# Patient Record
Sex: FEMALE | Race: WHITE | NOT HISPANIC OR LATINO | Employment: FULL TIME | URBAN - METROPOLITAN AREA
[De-identification: names, ages, dates, MRNs, and addresses within clinical notes are randomized per-mention and may not be internally consistent; named-entity substitution may affect disease eponyms.]

---

## 2017-09-06 ENCOUNTER — OFFICE VISIT (OUTPATIENT)
Dept: OBGYN | Facility: CLINIC | Age: 42
End: 2017-09-06
Payer: COMMERCIAL

## 2017-09-06 VITALS
HEART RATE: 79 BPM | BODY MASS INDEX: 28.88 KG/M2 | HEIGHT: 67 IN | DIASTOLIC BLOOD PRESSURE: 84 MMHG | WEIGHT: 184 LBS | SYSTOLIC BLOOD PRESSURE: 142 MMHG

## 2017-09-06 DIAGNOSIS — N63.0 BREAST LUMP: Primary | ICD-10-CM

## 2017-09-06 PROCEDURE — 99213 OFFICE O/P EST LOW 20 MIN: CPT | Performed by: OBSTETRICS & GYNECOLOGY

## 2017-09-06 NOTE — MR AVS SNAPSHOT
After Visit Summary   9/6/2017    Rin Garcia    MRN: [id number]           Patient Information     Date Of Birth          1975        Visit Information        Provider Department      9/6/2017 3:00 PM Guilherme Juan MD [facility]        Today's Diagnoses     Breast lump    -  1      Care Instructions      Preventive Health Recommendations  Female Ages 40 to 49    Yearly exam:     See your health care provider every year in order to  1. Review health changes.   2. Discuss preventive care.    3. Review your medicines if your doctor prescribed any.      Get a Pap test every three years (unless you have an abnormal result and your provider advises testing more often).      If you get Pap tests with HPV test, you only need to test every 5 years, unless you have an abnormal result. You do not need a Pap test if your uterus was removed (hysterectomy) and you have not had cancer.      You should be tested each year for STDs (sexually transmitted diseases), if you're at risk.       Ask your doctor if you should have a mammogram.      Have a colonoscopy (test for colon cancer) if someone in your family has had colon cancer or polyps before age 50.       Have a cholesterol test every 5 years.       Have a diabetes test (fasting glucose) after age 45. If you are at risk for diabetes, you should have this test every 3 years.    Shots: Get a flu shot each year. Get a tetanus shot every 10 years.     Nutrition:     Eat at least 5 servings of fruits and vegetables each day.    Eat whole-grain bread, whole-wheat pasta and brown rice instead of white grains and rice.    Talk to your provider about Calcium and Vitamin D.     Lifestyle    Exercise at least 150 minutes a week (an average of 30 minutes a day, 5 days a week). This will help you control your weight and prevent disease.    Limit alcohol to one drink per day.    No smoking.     Wear sunscreen to prevent skin cancer.    See your  "dentist every six months for an exam and cleaning.          Follow-ups after your visit        Follow-up notes from your care team     Return if symptoms worsen or fail to improve.      Your next 10 appointments already scheduled     Sep 20, 2017  2:00 PM CDT   MA DIAGNOSTIC DIGITAL BILATERAL with WYMA1   [facility] ([facility])    [address] [id number] [phone]           Do not use any powder, lotion or deodorant under your arms or on your breast. If you do, we will ask you to remove it before your exam.  Wear comfortable, two-piece clothing.  If you have any allergies, tell your care team.  Bring any previous mammograms from other facilities or have them mailed to the breast center.  Three-dimensional (3D) mammograms are available at [place] locations in LTAC, located within [facility], [facility], [facility], and [place]. [facility] locations include [place] [facility] in [place]. Benefits of 3D mammograms include: - Improved rate of cancer detection - Decreases your chance of having to go back for more tests, which means fewer: - \"False-positive\" results (This means that there is an abnormal area but it isn't cancer.) - Invasive testing procedures, such as a biopsy or surgery - Can provide clearer images of the breast if you have dense breast tissue. 3D mammography is an optional exam that anyone can have with a 2D mammogram. It doesn't replace or take the place of a 2D mammogram. 2D mammograms remain an effective screening test for all women.  Not all insurance companies cover the cost of a 3D mammogram. Check with your insurance.            Sep 20, 2017  2:30 PM CDT   US BREAST SHARONA CMPL 4 QUAD with WYUS4   [facility] ([facility])    [address] [id number] [phone]           Please bring a list of your medicines (including vitamins, minerals and " "over-the-counter drugs). Also, tell your doctor about any allergies you may have. Wear comfortable clothes and leave your valuables at home.  You do not need to do anything special to prepare for your exam.  Please call the Imaging Department at your exam site with any questions.              Who to contact     If you have questions or need follow up information about today's clinic visit or your schedule please contact [facility] directly at [phone].  Normal or non-critical lab and imaging results will be communicated to you by CallsFreeCallshart, letter or phone within 4 business days after the clinic has received the results. If you do not hear from us within 7 days, please contact the clinic through CallsFreeCallshart or phone. If you have a critical or abnormal lab result, we will notify you by phone as soon as possible.  Submit refill requests through [email or web address] or call your pharmacy and they will forward the refill request to us. Please allow 3 business days for your refill to be completed.          Additional Information About Your Visit        [email or web address] Information     [email or web address] lets you send messages to your doctor, view your test results, renew your prescriptions, schedule appointments and more. To sign up, go to [email or web address] . Click on \"Log in\" on the left side of the screen, which will take you to the Welcome page. Then click on \"Sign up Now\" on the right side of the page.     You will be asked to enter the access code listed below, as well as some personal information. Please follow the directions to create your username and password.     Your access code is: [email or web address]  Expires: 2017  1:40 PM     Your access code will  in 90 days. If you need help or a new code, please call your [place] clinic or [phone].        Care EveryWhere ID     This is your Care EveryWhere ID. This could be used by other organizations to access your [place] medical records  [id number]        Your Vitals " "Were     Pulse Height BMI (Body Mass Index)             79 1.708 m (5' 7.25\") 28.6 kg/m2          Blood Pressure from Last 3 Encounters:   09/06/17 142/84   10/16/14 126/78   09/22/14 125/70    Weight from Last 3 Encounters:   09/06/17 83.5 kg (184 lb)   10/16/14 73.5 kg (162 lb)   09/22/14 73.5 kg (162 lb)                 Today's Medication Changes          These changes are accurate as of: 9/6/17 11:59 PM.  If you have any questions, ask your nurse or doctor.               Stop taking these medicines if you haven't already. Please contact your care team if you have questions.     B-12 PO   Stopped by:  Guilherme Juan MD           FOLIC ACID PO   Stopped by:  Guilherme Juan MD           traMADol 50 MG tablet   Commonly known as:  ULTRAM   Stopped by:  Guilherme Juan MD                    Primary Care Provider    [facility]       No address on file        Equal Access to Services     TARSHA MCKEON AH: Hadsarwat garciao Somark, waaxda luqadaha, qaybta kaalmada adeegyada, phyllis lala . So [facility] [phone].    ATENCIÓN: Si habla español, tiene a gaitan disposición servicios gratuitos de asistencia lingüística. Llame al [phone].    We comply with applicable federal civil rights laws and [place] laws. We do not discriminate on the basis of race, color, national origin, age, disability sex, sexual orientation or gender identity.            Thank you!     Thank you for choosing [facility]  for your care. Our goal is always to provide you with excellent care. Hearing back from our patients is one way we can continue to improve our services. Please take a few minutes to complete the written survey that you may receive in the mail after your visit with us. Thank you!             Your Updated Medication List - Protect others around you: Learn how to safely use, store and throw away your medicines at www.disposemymeds.org.          This " list is accurate as of: 9/6/17 11:59 PM.  Always use your most recent med list.                   Brand Name Dispense Instructions for use Diagnosis    COMPLETE PROTEIN/VITAMIN SHAKE PO      Modere - daily protein and vitamin    Breast lump       MULTIVITAMIN TABS   OR      1 TABLET BY MOUTH DAILY

## 2017-09-06 NOTE — PROGRESS NOTES
SUBJECTIVE:   CC: Rin Garcia is an 42 year old woman who presents for preventive health visit.     Healthy Habits:    Do you get at least three servings of calcium containing foods daily (dairy, green leafy vegetables, etc.)? yes    Amount of exercise or daily activities, outside of work: 5 day(s) per week    Problems taking medications regularly not applicable    Medication side effects: No    Have you had an eye exam in the past two years? yes    Do you see a dentist twice per year? no    Do you have sleep apnea, excessive snoring or daytime drowsiness?no          Noticed Right breast lump on 8/8/17.     Today's PHQ-2 Score: PHQ-2 ( 1999 Pfizer) 9/6/2017 9/19/2012   Q1: Little interest or pleasure in doing things 0 1   Q2: Feeling down, depressed or hopeless 0 1   PHQ-2 Score 0 2         Abuse: Current or Past(Physical, Sexual or Emotional)- No  Do you feel safe in your environment - Yes  Social History   Substance Use Topics     Smoking status: Never Smoker     Smokeless tobacco: Never Used     Alcohol use Yes      Comment: occas.     The patient does not drink >3 drinks per day nor >7 drinks per week.    Reviewed orders with patient.  Reviewed health maintenance and updated orders accordingly - Yes  BP Readings from Last 3 Encounters:   09/06/17 142/84   10/16/14 126/78   09/22/14 125/70    Wt Readings from Last 3 Encounters:   09/06/17 83.5 kg (184 lb)   10/16/14 73.5 kg (162 lb)   09/22/14 73.5 kg (162 lb)                  Patient Active Problem List   Diagnosis     CARDIOVASCULAR SCREENING; LDL GOAL LESS THAN 160     Breast lump     Past Surgical History:   Procedure Laterality Date     GYN SURGERY      hysterectomy     HYSTERECTOMY, PAP NO LONGER INDICATED       PELVIS LAPAROSCOPY,DX      2003,04,06       Social History   Substance Use Topics     Smoking status: Never Smoker     Smokeless tobacco: Never Used     Alcohol use Yes      Comment: occas.     Family History   Problem Relation Age of Onset      Gynecology Mother      hysterectomy- pre ca     Hypertension Mother      CANCER Maternal Grandfather      CANCER Paternal Grandfather      CANCER Father      prostate and kidney     Dementia Maternal Grandmother          Current Outpatient Prescriptions   Medication Sig Dispense Refill     Nutritional Supplements (COMPLETE PROTEIN/VITAMIN SHAKE PO) Modere - daily protein and vitamin       MULTIVITAMIN TABS   OR 1 TABLET BY MOUTH DAILY       Allergies   Allergen Reactions     Augmentin Nausea and Vomiting and Diarrhea     Vicodin [Hydrocodone-Acetaminophen] Itching           Patient under age 50, mutual decision reflected in health maintenance.        Pertinent mammograms are reviewed under the imaging tab.  History of abnormal Pap smear: NO - age 30- 65 PAP every 3 years recommended    Reviewed and updated as needed this visit by clinical staffTobacco  Allergies  Meds  Med Hx  Surg Hx  Fam Hx  Soc Hx        Reviewed and updated as needed this visit by Provider        Past Medical History:   Diagnosis Date     Abnormal Pap smear of cervix remote past    ?details?     Endometriosis      Endometriosis 2009     PONV (postoperative nausea and vomiting)       Past Surgical History:   Procedure Laterality Date     GYN SURGERY      hysterectomy     HYSTERECTOMY, PAP NO LONGER INDICATED       PELVIS LAPAROSCOPY,DX      ,     Obstetric History       T1      L1     SAB0   TAB0   Ectopic0   Multiple0   Live Births0       # Outcome Date GA Lbr Poncho/2nd Weight Sex Delivery Anes PTL Lv   1 Term               Name: Santhosh          ROS:  C: NEGATIVE for fever, chills, change in weight  I: NEGATIVE for worrisome rashes, moles or lesions  E: NEGATIVE for vision changes or irritation  ENT: NEGATIVE for ear, mouth and throat problems  R: NEGATIVE for significant cough or SOB  BREAST: POSITIVE for NEGATIVE and lump grape sized in the right breast  CV: NEGATIVE for chest pain, palpitations or peripheral  "edema  GI: NEGATIVE for nausea, abdominal pain, heartburn, or change in bowel habits  : NEGATIVE for unusual urinary or vaginal symptoms. Periods are regular.  M: NEGATIVE for significant arthralgias or myalgia  N: NEGATIVE for weakness, dizziness or paresthesias  E: NEGATIVE for temperature intolerance, skin/hair changes  P: NEGATIVE for changes in mood or affect    OBJECTIVE:   /84 (BP Location: Right arm, Cuff Size: Adult Large)  Pulse 79  Ht 5' 7.25\" (1.708 m)  Wt 184 lb (83.5 kg)  BMI 28.6 kg/m2  EXAM:  GENERAL: healthy, alert and no distress  EYES: Eyes grossly normal to inspection, PERRL and conjunctivae and sclerae normal  NECK: no adenopathy, no asymmetry, masses, or scars and thyroid normal to palpation  BREAST: Left:normal without masses, tenderness or nipple discharge, no palpable axillary masses or adenopathy   Right  mass right breast 1 cm mobile and nontender-     CV: regular rate and rhythm, normal S1 S2, no S3 or S4, no murmur, click or rub, no peripheral edema and peripheral pulses strong  ABDOMEN: soft, nontender, no hepatosplenomegaly, no masses and bowel sounds normal   (female): normal female external genitalia, normal urethral meatus, vaginal mucosa pink, moist, well rugated, and normal cervix/adnexa/uterus without masses or discharge  MS: no gross musculoskeletal defects noted, no edema  SKIN: no suspicious lesions or rashes  NEURO: Normal strength and tone, mentation intact and speech normal  PSYCH: mentation appears normal, affect normal/bright    ASSESSMENT/PLAN:   1. Breast lump  Right  - Nutritional Supplements (COMPLETE PROTEIN/VITAMIN SHAKE PO); Modere - daily protein and vitamin  - MA Diagnostic Digital Bilateral; Future  - US Breast Bilateral Complete 4 Quadrants; Future    COUNSELING:   Reviewed preventive health counseling, as reflected in patient instructions       Regular exercise       Healthy diet/nutrition         reports that she has never smoked. She has never " "used smokeless tobacco.    Estimated body mass index is 28.6 kg/(m^2) as calculated from the following:    Height as of this encounter: 5' 7.25\" (1.708 m).    Weight as of this encounter: 184 lb (83.5 kg).         Counseling Resources:  ATP IV Guidelines  Pooled Cohorts Equation Calculator  Breast Cancer Risk Calculator  FRAX Risk Assessment  ICSI Preventive Guidelines  Dietary Guidelines for Americans, 2010  USDA's MyPlate  ASA Prophylaxis  Lung CA Screening    Guilherme Juan MD  [facility]  "

## 2017-09-20 ENCOUNTER — HOSPITAL ENCOUNTER (OUTPATIENT)
Dept: MAMMOGRAPHY | Facility: CLINIC | Age: 42
Discharge: HOME | End: 2017-09-20
Attending: OBSTETRICS & GYNECOLOGY | Admitting: OBSTETRICS & GYNECOLOGY
Payer: COMMERCIAL

## 2017-09-20 ENCOUNTER — HOSPITAL ENCOUNTER (OUTPATIENT)
Dept: ULTRASOUND IMAGING | Facility: CLINIC | Age: 42
End: 2017-09-20
Attending: OBSTETRICS & GYNECOLOGY
Payer: COMMERCIAL

## 2017-09-20 DIAGNOSIS — N63.0 BREAST LUMP: ICD-10-CM

## 2017-09-20 PROCEDURE — G0279 TOMOSYNTHESIS, MAMMO: HCPCS

## 2017-09-20 PROCEDURE — G0204 DX MAMMO INCL CAD BI: HCPCS

## 2017-09-20 PROCEDURE — 76642 ULTRASOUND BREAST LIMITED: CPT | Mod: 50

## 2017-10-04 ENCOUNTER — HOSPITAL ENCOUNTER (OUTPATIENT)
Dept: ULTRASOUND IMAGING | Facility: CLINIC | Age: 42
Discharge: HOME | End: 2017-10-04
Attending: OBSTETRICS & GYNECOLOGY | Admitting: OBSTETRICS & GYNECOLOGY
Payer: COMMERCIAL

## 2017-10-04 ENCOUNTER — HOSPITAL ENCOUNTER (OUTPATIENT)
Dept: MAMMOGRAPHY | Facility: CLINIC | Age: 42
End: 2017-10-04
Attending: OBSTETRICS & GYNECOLOGY
Payer: COMMERCIAL

## 2017-10-04 DIAGNOSIS — N64.9 BREAST LESION: ICD-10-CM

## 2017-10-04 PROCEDURE — 88305 TISSUE EXAM BY PATHOLOGIST: CPT | Performed by: RADIOLOGY

## 2017-10-04 PROCEDURE — 40000986 MA POST PROCEDURE RIGHT

## 2017-10-04 PROCEDURE — 27210206 US BREAST BIOPSY CORE NEEDLE RIGHT

## 2017-10-04 PROCEDURE — 25000125 ZZHC RX 250: Performed by: RADIOLOGY

## 2017-10-04 PROCEDURE — 88305 TISSUE EXAM BY PATHOLOGIST: CPT | Mod: 26 | Performed by: RADIOLOGY

## 2017-10-04 RX ADMIN — LIDOCAINE HYDROCHLORIDE 8 ML: 10 INJECTION, SOLUTION EPIDURAL; INFILTRATION; INTRACAUDAL; PERINEURAL at 12:47

## 2017-10-04 NOTE — PROGRESS NOTES
RADIOLOGY PROCEDURE NOTE  Patient name: Rin Garcia  MRN: [id number]  : 1975    Pre-procedure diagnosis: Right breast lesion.  Post-procedure diagnosis: Same    Procedure Date/Time: 2017  1:04 PM  Procedure: US guided needle core biopsy.  Estimated blood loss: None  Specimen(s) collected:  Five needle cores.  The patient tolerated the procedure well with no immediate complications.    See imaging dictation for procedural details.    Provider name: Michael Souza  Assistant(s):None

## 2017-10-04 NOTE — IP AVS SNAPSHOT
MRN:[id number]                      After Visit Summary   10/4/2017    Rin Garcia    MRN: [id number]           Visit Information        Provider Department      10/4/2017 12:30 PM WY RAD; WYUS4 [facility]           Review of your medicines      UNREVIEWED medicines. Ask your doctor about these medicines        Dose / Directions    COMPLETE PROTEIN/VITAMIN SHAKE PO   Used for:  Breast lump        Modere - daily protein and vitamin   Refills:  0       MULTIVITAMIN TABS   OR        1 TABLET BY MOUTH DAILY   Refills:  0                Protect others around you: Learn how to safely use, store and throw away your medicines at www.disposemymeds.org.         Follow-ups after your visit        Your next 10 appointments already scheduled     Oct 04, 2017 12:30 PM CDT   US BREAST BIOPSY CORE NEEDLE RIGHT with WYUS4, WY RAD   [facility] ([facility])    [address] [address] [phone]           Tell us in advance if there s any chance you may be pregnant.  Bring a list of your medicines to the exam. Include vitamins, minerals and over-the-counter drugs.  If you take blood thinners, you may need to stop taking them a few days before treatment. Talk to your doctor before stopping these medicines. You will need a blood test the morning of your exam.   Stop taking Coumadin (warfarin) 3 days before your exam. Restart the day after your exam.   If you take aspirin, you may need to stop taking it 3 days before your scan.   If you take Plavix, Ticlid, Pletal or Persantine, you may need to stop taking them 5 days before your scan. Please talk to your doctor before stopping these medicines.  If you will receive sedation for this test (medicine to help you relax):   See your family doctor for an exam within 30 days of treatment.   Plan for an adult to drive you home and stay with you for at least 6 hours.   Follow the eating and drinking  guidelines checked below:   No eating or drinking for 4 hours before your test. You may take medicine with small sips of water.   If you have diabetes:If you take insulin, call your diabetes care team. Do not take diabetes pills on the morning of your test. If you take metformin (Avandamet, Glucophage, Glucovance, Metaglip) and received contrast, wait 48 hours before re-starting this medicine.  Please call the Imaging Department at your exam site with any questions.            Oct 04, 2017  1:30 PM CDT   MA POST PROCEDURE RIGHT with WYMA1   [facility] ([facility])    [address]  [facility] [address]   [phone]           Do not use any powder, lotion or deodorant under your arms or on your breast. If you do, we will ask you to remove it before your exam.  Wear comfortable, two-piece clothing.  If you have any allergies, tell your care team.  Bring any previous mammograms from other facilities or have them mailed to the breast center.               Care Instructions        Further instructions from your care team       Breast Biopsy Care Instructions      Site Care:    You may have some discomfort in the breast and may see some bruising at the needle site.    You will be given an ice pack which should be kept in place over the dressing until bedtime tonight.Always keep a gauze pad between your skin and the ice pack.    Wearing your bra continuously for 24 hours post biopsy will give optimal support to the biopsy site.    Activity:       You may go back to your normal routine.     No heavy lifting for 24 hours.    Diet and Medications:       You may go back to your regular diet.     Tylenol is the best choice to relieve your discomfort, the first 24 hours. If you have no bleeding on the first day, Ibuprofen (such as Advil, or Motrin), may be taken on the second day after for pain.     Do not take aspirin for 72 hours following your biopsy.    Questions:     If you have any  "questions about your procedure performed in Ultrasound, you may contact us at [phone].If you have any questions about your procedure performed in Mammography, you may contact us at [phone].    Results:       The pathology report on your biopsy is usually available within 72 hours. The Radiologist or your personal physician will call you with the results.     You will receive information about any further follow up from your physician.    Call your doctor if you have:       More than slight bleeding or significant pain, or experience swelling of the breast.     If your physician is unavailable, please call the Nurse Advice Line at [phone], or [facility] Emergency Department at [phone].      Patient:______________________________  Time:_________  Date:_____________    Instructor:____________________________   Time:_________  Date:_____________      Additional Information About Your Visit        MyChart Information     TapPresshart lets you send messages to your doctor, view your test results, renew your prescriptions, schedule appointments and more. To sign up, go to [email or web address] . Click on \"Log in\" on the left side of the screen, which will take you to the Welcome page. Then click on \"Sign up Now\" on the right side of the page.     You will be asked to enter the access code listed below, as well as some personal information. Please follow the directions to create your username and password.     Your access code is: [email or web address]  Expires: 2017  1:40 PM     Your access code will  in 90 days. If you need help or a new code, please call your [place] clinic or [phone].        Care EveryWhere ID     This is your Care EveryWhere ID. This could be used by other organizations to access your [place] medical records  [id number]         Primary Care Provider Office Phone # Fax #    [facility] [phone]      Equal Access to Services     TARSHA" GAAR : Hadii aad ku hadmelodori Sallyali, waaxda luqadaha, qaybta kanatefloyd cornejo, phyllis lala . So [facility] [phone].    ATENCIÓN: Si habla español, tiene a gaitan disposición servicios gratuitos de asistencia lingüística. Llame al [phone].    We comply with applicable federal civil rights laws and [place] laws. We do not discriminate on the basis of race, color, national origin, age, disability, sex, sexual orientation, or gender identity.            Thank you!     Thank you for choosing [place] for your care. Our goal is always to provide you with excellent care. Hearing back from our patients is one way we can continue to improve our services. Please take a few minutes to complete the written survey that you may receive in the mail after you visit with us. Thank you!             Medication List: This is a list of all your medications and when to take them. Check marks below indicate your daily home schedule. Keep this list as a reference.      Medications           Morning Afternoon Evening Bedtime As Needed    COMPLETE PROTEIN/VITAMIN SHAKE PO   Modere - daily protein and vitamin                                MULTIVITAMIN TABS   OR   1 TABLET BY MOUTH DAILY

## 2017-10-04 NOTE — IP AVS SNAPSHOT
[facility]    [address] [facility] [address]    Phone:  [phone]                                       After Visit Summary   10/4/2017    Rin Garcia    MRN: [id number]           After Visit Summary Signature Page     I have received my discharge instructions, and my questions have been answered. I have discussed any challenges I see with this plan with the nurse or doctor.    ..........................................................................................................................................  Patient/Patient Representative Signature      ..........................................................................................................................................  Patient Representative Print Name and Relationship to Patient    ..................................................               ................................................  Date                                            Time    ..........................................................................................................................................  Reviewed by Signature/Title    ...................................................              ..............................................  Date                                                            Time

## 2017-10-04 NOTE — DISCHARGE INSTRUCTIONS
Breast Biopsy Care Instructions      Site Care:    You may have some discomfort in the breast and may see some bruising at the needle site.    You will be given an ice pack which should be kept in place over the dressing until bedtime tonight.Always keep a gauze pad between your skin and the ice pack.    Wearing your bra continuously for 24 hours post biopsy will give optimal support to the biopsy site.    Activity:       You may go back to your normal routine.     No heavy lifting for 24 hours.    Diet and Medications:       You may go back to your regular diet.     Tylenol is the best choice to relieve your discomfort, the first 24 hours. If you have no bleeding on the first day, Ibuprofen (such as Advil, or Motrin), may be taken on the second day after for pain.     Do not take aspirin for 72 hours following your biopsy.    Questions:     If you have any questions about your procedure performed in Ultrasound, you may contact us at [phone].If you have any questions about your procedure performed in Mammography, you may contact us at [phone].    Results:       The pathology report on your biopsy is usually available within 72 hours. The Radiologist or your personal physician will call you with the results.     You will receive information about any further follow up from your physician.    Call your doctor if you have:       More than slight bleeding or significant pain, or experience swelling of the breast.     If your physician is unavailable, please call the Nurse Advice Line at [phone], or [facility] Emergency Department at [phone].      Patient:______________________________  Time:_________  Date:_____________    Instructor:____________________________   Time:_________  Date:_____________

## 2017-10-09 LAB — COPATH REPORT: NORMAL

## 2019-11-03 ENCOUNTER — HEALTH MAINTENANCE LETTER (OUTPATIENT)
Age: 44
End: 2019-11-03

## 2019-11-19 ENCOUNTER — TRANSFERRED RECORDS (OUTPATIENT)
Dept: HEALTH INFORMATION MANAGEMENT | Facility: CLINIC | Age: 44
End: 2019-11-19

## 2020-11-16 ENCOUNTER — HEALTH MAINTENANCE LETTER (OUTPATIENT)
Age: 45
End: 2020-11-16

## 2021-03-01 ENCOUNTER — HOSPITAL ENCOUNTER (OUTPATIENT)
Dept: MAMMOGRAPHY | Facility: CLINIC | Age: 46
Discharge: HOME | End: 2021-03-01
Attending: OBSTETRICS & GYNECOLOGY | Admitting: OBSTETRICS & GYNECOLOGY
Payer: COMMERCIAL

## 2021-03-01 DIAGNOSIS — Z12.31 SCREENING MAMMOGRAM FOR HIGH-RISK PATIENT: ICD-10-CM

## 2021-03-01 PROCEDURE — 77063 BREAST TOMOSYNTHESIS BI: CPT

## 2021-09-18 ENCOUNTER — HEALTH MAINTENANCE LETTER (OUTPATIENT)
Age: 46
End: 2021-09-18

## 2022-01-08 ENCOUNTER — HEALTH MAINTENANCE LETTER (OUTPATIENT)
Age: 47
End: 2022-01-08

## 2022-04-13 ENCOUNTER — HOSPITAL ENCOUNTER (OUTPATIENT)
Dept: MAMMOGRAPHY | Facility: CLINIC | Age: 47
Discharge: HOME | End: 2022-04-13
Attending: OBSTETRICS & GYNECOLOGY | Admitting: OBSTETRICS & GYNECOLOGY
Payer: COMMERCIAL

## 2022-04-13 DIAGNOSIS — Z12.31 VISIT FOR SCREENING MAMMOGRAM: ICD-10-CM

## 2022-04-13 PROCEDURE — 77067 SCR MAMMO BI INCL CAD: CPT

## 2022-04-19 ENCOUNTER — OFFICE VISIT (OUTPATIENT)
Dept: OBGYN | Facility: CLINIC | Age: 47
End: 2022-04-19
Payer: COMMERCIAL

## 2022-04-19 VITALS
DIASTOLIC BLOOD PRESSURE: 94 MMHG | HEIGHT: 67 IN | BODY MASS INDEX: 31.39 KG/M2 | TEMPERATURE: 97.6 F | HEART RATE: 88 BPM | WEIGHT: 200 LBS | SYSTOLIC BLOOD PRESSURE: 164 MMHG

## 2022-04-19 DIAGNOSIS — Z80.3 FAMILY HISTORY OF MALIGNANT NEOPLASM OF BREAST: ICD-10-CM

## 2022-04-19 DIAGNOSIS — N95.1 MENOPAUSAL SYNDROME (HOT FLASHES): ICD-10-CM

## 2022-04-19 DIAGNOSIS — R03.0 ELEVATED BLOOD-PRESSURE READING WITHOUT DIAGNOSIS OF HYPERTENSION: ICD-10-CM

## 2022-04-19 DIAGNOSIS — R10.2 PELVIC PAIN IN FEMALE: Primary | ICD-10-CM

## 2022-04-19 LAB
ESTRADIOL SERPL-MCNC: 92 PG/ML
FSH SERPL-ACNC: 3.7 IU/L

## 2022-04-19 PROCEDURE — 99204 OFFICE O/P NEW MOD 45 MIN: CPT | Performed by: OBSTETRICS & GYNECOLOGY

## 2022-04-19 PROCEDURE — 83001 ASSAY OF GONADOTROPIN (FSH): CPT | Performed by: OBSTETRICS & GYNECOLOGY

## 2022-04-19 PROCEDURE — 36415 COLL VENOUS BLD VENIPUNCTURE: CPT | Performed by: OBSTETRICS & GYNECOLOGY

## 2022-04-19 PROCEDURE — 82670 ASSAY OF TOTAL ESTRADIOL: CPT | Performed by: OBSTETRICS & GYNECOLOGY

## 2022-04-19 NOTE — NURSING NOTE
"Initial BP (!) 164/94 (BP Location: Right arm, Patient Position: Chair, Cuff Size: Adult Regular)   Pulse 88   Temp 97.6  F (36.4  C) (Tympanic)   Ht 1.702 m (5' 7\")   Wt 90.7 kg (200 lb)   Breastfeeding No   BMI 31.32 kg/m   Estimated body mass index is 31.32 kg/m  as calculated from the following:    Height as of this encounter: 1.702 m (5' 7\").    Weight as of this encounter: 90.7 kg (200 lb). .    Darlene Norris MA    "

## 2022-04-23 ENCOUNTER — HOSPITAL ENCOUNTER (OUTPATIENT)
Dept: ULTRASOUND IMAGING | Facility: CLINIC | Age: 47
Discharge: HOME | End: 2022-04-23
Attending: OBSTETRICS & GYNECOLOGY | Admitting: OBSTETRICS & GYNECOLOGY
Payer: COMMERCIAL

## 2022-04-23 DIAGNOSIS — R10.2 PELVIC PAIN IN FEMALE: ICD-10-CM

## 2022-04-23 PROCEDURE — 76856 US EXAM PELVIC COMPLETE: CPT

## 2022-04-28 ENCOUNTER — TELEPHONE (OUTPATIENT)
Dept: OBGYN | Facility: CLINIC | Age: 47
End: 2022-04-28
Payer: COMMERCIAL

## 2022-04-28 NOTE — TELEPHONE ENCOUNTER
Spoke to pt to schedule surgery.  She will call us back with a date.    -Chiquis Eddy  [facility]

## 2022-04-28 NOTE — PROGRESS NOTES
CC:  Follow up  from herself for abdomino-pelvic pain  HPI:  Rin Garcia is a 47 year old female is a   .  No LMP recorded. Patient has had a hysterectomy.  She has a hx of  Hysterectomy for  Endometriosis in the past  She has new onset of dyspareunia over the last year  She experiences hot flashes and night sweats.  She denies vaginal dryness, bleeding  She describes the pain as  Sharp and intermittent.  She is s/p hysterectomy in 2010  Her pap's have been normal  Patients records are available and reviewed at today's visit.  FAMILY HISTORY   The patient has a family history of ovarian cancer  And breast cancer - none of which have been in first degree relatives    Past GYN history:  No STD history       Last PAP smear:  Normal    Past Medical History:   Diagnosis Date     Abnormal Pap smear of cervix remote past    ?details?     Endometriosis      Endometriosis 08/18/2009     PONV (postoperative nausea and vomiting)        Past Surgical History:   Procedure Laterality Date     BIOPSY BREAST       GYN SURGERY      hysterectomy     HYSTERECTOMY, PAP NO LONGER INDICATED       PELVIS LAPAROSCOPY,DX      2003,04,06       Family History   Problem Relation Age of Onset     Gynecology Mother         hysterectomy- pre ca     Hypertension Mother      Cancer Maternal Grandfather      Cancer Paternal Grandfather      Cancer Father         prostate and kidney     Dementia Maternal Grandmother        Allergies: Augmentin and Vicodin [hydrocodone-acetaminophen]    Current Outpatient Medications   Medication Sig Dispense Refill     MULTIVITAMIN TABS   OR 1 TABLET BY MOUTH DAILY       psyllium (METAMUCIL/KONSYL) 58.6 % powder Take by mouth daily         ROS:  C: NEGATIVE for fever, chills, change in weight  I: NEGATIVE for worrisome rashes, moles or lesions  E: NEGATIVE for vision changes or irritation  E/M: NEGATIVE for ear, mouth and throat problems  R: NEGATIVE for significant cough or SOB  CV: NEGATIVE for chest  "pain, palpitations or peripheral edema  GI: NEGATIVE for nausea, abdominal pain, heartburn, or change in bowel habits  : NEGATIVE for frequency, dysuria, hematuria, vaginal discharge  M: NEGATIVE for significant arthralgias or myalgia  N: NEGATIVE for weakness, dizziness or paresthesias  E: NEGATIVE for temperature intolerance, skin/hair changes  P: NEGATIVE for changes in mood or affect    EXAM:  Blood pressure (!) 164/94, pulse 88, temperature 97.6  F (36.4  C), temperature source Tympanic, height 1.702 m (5' 7\"), weight 90.7 kg (200 lb), not currently breastfeeding.   BMI= Body mass index is 31.32 kg/m .  General - pleasant female in no acute distress.  Abdomen - soft, nontender, nondistended, no hepatosplenomegaly.  Pelvic - EG: normal adult female,   BUS: within normal limits,   Vagina: well rugated, no discharge, well supported  Cervix:/Uterus: surgically absent  Adnexae: no masses or tenderness.  Rectovaginal - deferred.  Musculoskeletal - no gross deformities.  Neurological - normal strength, sensation, and mental status.      ASSESSMENT/PLAN:  (R10.2) Pelvic pain in female  (primary encounter diagnosis)  Comment: ULTRASOUND negative for pelvic massesPlan: US Pelvic Transabdominal and Transvaginal, Case        Request: Laparoscopy            (N95.1) Menopausal syndrome (hot flashes)  Comment:   Plan: Estradiol, Follicle stimulating hormone, Case         Request: Laparoscopy            (Z80.3) Family history of malignant neoplasm of breast  Comment:   Plan: Case Request: Laparoscopy            (R03.0) Elevated blood-pressure reading without diagnosis of hypertension  Comment:   Plan:follow up with her PCP    I described the procedures as indicated above. The types of anesthesia were reviewed. The pre and post-op expectations were noted. We discussed the risks and benefits of the above procedures. The risk of bleeding, infection and damage to other organs was reviewed. The possibility of much larger " incision(s) was discussed.  No guarantees were made.  She did express understanding and desires to proceed with surgery.    Letter will be sent to the referring provider.    Guilherme Juan MD

## 2022-05-02 NOTE — TELEPHONE ENCOUNTER
"[id number]  Rin Garcia    You are now scheduled for surgery at [facility].  Below are the details for your surgery.  Please read the \"Preparing for Your Surgery\" instructions and let us know if you have any questions.    Type of surgery:  Laparoscopy  Surgeon:  Guilherme Juan MD  Location of surgery: [facility] OR    Date of surgery:  6/6/22    Time: 10:10 am   Arrival Time:  9:00 am    Time can change, to be confirmed a couple of days prior by pre-op surgery nurse.    Pre-Op Appt Date: Patient to schedule with a PCP or Family Practice Provider within 30 days to the surgery.  Post-Op Appt Date: To be determined by provider     COVID test 2-4 days prior at [facility]   Date  6/3/22  Time  3:00 pm (Get Soap from Pharmacy for surgery)     Packet sent out: Yes  Pre-cert/Authorization completed:  TBD by Financial Securing Office.   MA Sterilization/Hysterectomy Acknowledgment Consent signed: No    [facility]  [phone]    Fax: [phone]  Same Day Surgery [phone]  Fax: [phone]  [facility] [phone]      "

## 2022-05-26 DIAGNOSIS — Z11.59 ENCOUNTER FOR SCREENING FOR OTHER VIRAL DISEASES: Primary | ICD-10-CM

## 2022-05-31 ENCOUNTER — OFFICE VISIT (OUTPATIENT)
Dept: FAMILY MEDICINE | Facility: CLINIC | Age: 47
End: 2022-05-31
Payer: COMMERCIAL

## 2022-05-31 VITALS
RESPIRATION RATE: 16 BRPM | OXYGEN SATURATION: 98 % | HEART RATE: 82 BPM | WEIGHT: 197.4 LBS | TEMPERATURE: 97.5 F | HEIGHT: 67 IN | DIASTOLIC BLOOD PRESSURE: 88 MMHG | BODY MASS INDEX: 30.98 KG/M2 | SYSTOLIC BLOOD PRESSURE: 140 MMHG

## 2022-05-31 DIAGNOSIS — R10.2 PELVIC PAIN IN FEMALE: Primary | ICD-10-CM

## 2022-05-31 DIAGNOSIS — E78.2 MIXED HYPERLIPIDEMIA: ICD-10-CM

## 2022-05-31 DIAGNOSIS — Z01.818 PREOP GENERAL PHYSICAL EXAM: ICD-10-CM

## 2022-05-31 DIAGNOSIS — R03.0 ELEVATED BLOOD PRESSURE READING WITHOUT DIAGNOSIS OF HYPERTENSION: ICD-10-CM

## 2022-05-31 PROCEDURE — 99204 OFFICE O/P NEW MOD 45 MIN: CPT | Performed by: FAMILY MEDICINE

## 2022-05-31 RX ORDER — METHOCARBAMOL 500 MG/1
TABLET, FILM COATED ORAL
COMMUNITY
Start: 2022-04-06 | End: 2023-04-14

## 2022-05-31 ASSESSMENT — PAIN SCALES - GENERAL: PAINLEVEL: NO PAIN (0)

## 2022-05-31 NOTE — PROGRESS NOTES
[facility]  [address] [place] [id number]  Phone: [phone]  Fax: [phone]  Primary Provider: [facility]        PREOPERATIVE EVALUATION:  Today's date: 5/31/2022    Rin Garcia is a 47 year old female who presents for a preoperative evaluation.    Surgical Information:  Surgery/Procedure: Bilateral laproscopy  Surgery Location: [facility]  Surgeon: Guilherme Juan  Surgery Date: 6/6/2022  Time of Surgery: TBD  Where patient plans to recover: At home with family  Fax number for surgical facility: Note does not need to be faxed, will be available electronically in Epic.    Type of Anesthesia Anticipated: General    Assessment & Plan     The proposed surgical procedure is considered LOW risk.      ICD-10-CM    1. Pelvic pain in female  R10.2    2. Preop general physical exam  Z01.818    3. Elevated blood pressure reading without diagnosis of hypertension  R03.0    4. Mixed hyperlipidemia  E78.2        Risks and Recommendations:  The patient has the following additional risks and recommendations for perioperative complications:   - No identified additional risk factors other than previously addressed    Medication Instructions:  Patient is on no chronic medications    RECOMMENDATION:  APPROVAL GIVEN to proceed with proposed procedure, without further diagnostic evaluation.      Subjective     HPI related to upcoming procedure:   47-year-old female presents for a preop physical exam.  Patient is scheduled to have bilateral laparoscopy on June 6, 2022.  She requires evaluation and anesthesia risk assessment prior to undergoing surgery/procedure.  Patient denies any fever, chills, sore throat, cough, shortness of breath, chest pain, palpitation, diarrhea, constipation, abdominal pain, headache or other relevant systemic symptoms.    Preop Questions 5/30/2022   1. Have you ever had a heart attack or stroke? No   2. Have you ever had  surgery on your heart or blood vessels, such as a stent placement, a coronary artery bypass, or surgery on an artery in your head, neck, heart, or legs? No   3. Do you have chest pain with activity? No   4. Do you have a history of  heart failure? No   5. Do you currently have a cold, bronchitis or symptoms of other infection? No   6. Do you have a cough, shortness of breath, or wheezing? No   7. Do you or anyone in your family have previous history of blood clots? No   8. Do you or does anyone in your family have a serious bleeding problem such as prolonged bleeding following surgeries or cuts? No   9. Have you ever had problems with anemia or been told to take iron pills? No   10. Have you had any abnormal blood loss such as black, tarry or bloody stools, or abnormal vaginal bleeding? No   11. Have you ever had a blood transfusion? No   12. Are you willing to have a blood transfusion if it is medically needed before, during, or after your surgery? Yes   13. Have you or any of your relatives ever had problems with anesthesia? YES -    14. Do you have sleep apnea, excessive snoring or daytime drowsiness? No   15. Do you have any artifical heart valves or other implanted medical devices like a pacemaker, defibrillator, or continuous glucose monitor? No   16. Do you have artificial joints? No   17. Are you allergic to latex? No   18. Is there any chance that you may be pregnant? No       Health Care Directive:  Patient does not have a Health Care Directive or Living Will: Discussed advance care planning with patient; information given to patient to review.    Preoperative Review of :   reviewed - no record of controlled substances prescribed.        Review of Systems  Constitutional, neuro, ENT, endocrine, pulmonary, cardiac, gastrointestinal, genitourinary, musculoskeletal, integument and psychiatric systems are negative, except as otherwise noted.    Patient Active Problem List    Diagnosis Date Noted      Breast lump 09/19/2012     Priority: Medium     CARDIOVASCULAR SCREENING; LDL GOAL LESS THAN 160 10/31/2010     Priority: Medium      Past Medical History:   Diagnosis Date     Abnormal Pap smear of cervix remote past    ?details?     Endometriosis      Endometriosis 08/18/2009     Hypertension 10yrs    Prehypertension. not on meds     PONV (postoperative nausea and vomiting)      Past Surgical History:   Procedure Laterality Date     APPENDECTOMY       BIOPSY BREAST       COLONOSCOPY  11/2019    Family history, on 3yr plan     GYN SURGERY      hysterectomy     HYSTERECTOMY, PAP NO LONGER INDICATED       PELVIS LAPAROSCOPY,DX      2003,04,06     Current Outpatient Medications   Medication Sig Dispense Refill     MULTIVITAMIN TABS   OR 1 TABLET BY MOUTH DAILY       psyllium (METAMUCIL/KONSYL) 58.6 % powder Take by mouth daily       methocarbamol (ROBAXIN) 500 MG tablet  (Patient not taking: Reported on 5/31/2022)         Allergies   Allergen Reactions     Augmentin Nausea and Vomiting and Diarrhea     Vicodin [Hydrocodone-Acetaminophen] Itching        Social History     Tobacco Use     Smoking status: Never Smoker     Smokeless tobacco: Never Used   Substance Use Topics     Alcohol use: Yes     Comment: 1 drink per month     Family History   Problem Relation Age of Onset     Gynecology Mother         hysterectomy- pre ca     Hypertension Mother      Cancer Father         colon, prostate and kidney     Colon Cancer Father      Prostate Cancer Father      Dementia Maternal Grandmother      Osteoporosis Maternal Grandmother      Cancer Maternal Grandfather      Colon Cancer Maternal Grandfather      Cancer Paternal Grandfather      Colon Cancer Paternal Grandfather      Colon Polyps Sister      Hirschsprung's Disease Nephew      Breast Cancer Other      Cancer Other         stomach/esophogus     Other Cancer Other         Stomach     Myocardial Infarction Paternal Great-Grandfather      Myocardial Infarction Paternal  "Great-Grandmother      Cancer Maternal Great-Grandfather         Lung and Colon     Hypertension Maternal Great-Grandfather      History   Drug Use No         Objective     BP (!) 140/88 (BP Location: Right arm, Patient Position: Sitting, Cuff Size: Adult Large)   Pulse 82   Temp 97.5  F (36.4  C) (Tympanic)   Resp 16   Ht 1.702 m (5' 7\")   Wt 89.5 kg (197 lb 6.4 oz)   LMP  (LMP Unknown)   SpO2 98%   Breastfeeding No   BMI 30.92 kg/m      Physical Exam    GENERAL APPEARANCE: healthy, alert and no distress     EYES: EOMI, PERRL     HENT: ear canals and TM's normal and nose and mouth without ulcers or lesions     NECK: no adenopathy, no asymmetry, masses, or scars and thyroid normal to palpation     RESP: lungs clear to auscultation - no rales, rhonchi or wheezes     CV: regular rates and rhythm, normal S1 S2, no S3 or S4 and no murmur, click or rub     ABDOMEN:  soft, nontender, no HSM or masses and bowel sounds normal     MS: extremities normal- no gross deformities noted, no evidence of inflammation in joints, FROM in all extremities.     SKIN: no suspicious lesions or rashes     NEURO: Normal strength and tone, sensory exam grossly normal, mentation intact and speech normal     PSYCH: mentation appears normal. and affect normal/bright     LYMPHATICS: No cervical adenopathy    No results for input(s): HGB, PLT, INR, NA, POTASSIUM, CR, A1C in the last 82732 hours.     Diagnostics:  No labs were ordered during this visit.   No EKG required for low risk surgery (cataract, skin procedure, breast biopsy, etc).    Revised Cardiac Risk Index (RCRI):  The patient has the following serious cardiovascular risks for perioperative complications:   - No serious cardiac risks = 0 points     RCRI Interpretation: 0 points: Class I (very low risk - 0.4% complication rate)           Signed Electronically by: Jose M Jimenez MD  Copy of this evaluation report is provided to requesting physician.      "

## 2022-05-31 NOTE — H&P (VIEW-ONLY)
[facility]  [address] [place] [address]  Phone: [phone]  Fax: [phone]  Primary Provider: [facility]        PREOPERATIVE EVALUATION:  Today's date: 5/31/2022    Rin Garcia is a 47 year old female who presents for a preoperative evaluation.    Surgical Information:  Surgery/Procedure: Bilateral laproscopy  Surgery Location: [facility]  Surgeon: Guilherme Juan  Surgery Date: 6/6/2022  Time of Surgery: TBD  Where patient plans to recover: At home with family  Fax number for surgical facility: Note does not need to be faxed, will be available electronically in Epic.    Type of Anesthesia Anticipated: General    Assessment & Plan     The proposed surgical procedure is considered LOW risk.      ICD-10-CM    1. Pelvic pain in female  R10.2    2. Preop general physical exam  Z01.818    3. Elevated blood pressure reading without diagnosis of hypertension  R03.0    4. Mixed hyperlipidemia  E78.2        Risks and Recommendations:  The patient has the following additional risks and recommendations for perioperative complications:   - No identified additional risk factors other than previously addressed    Medication Instructions:  Patient is on no chronic medications    RECOMMENDATION:  APPROVAL GIVEN to proceed with proposed procedure, without further diagnostic evaluation.      Subjective     HPI related to upcoming procedure:   47-year-old female presents for a preop physical exam.  Patient is scheduled to have bilateral laparoscopy on June 6, 2022.  She requires evaluation and anesthesia risk assessment prior to undergoing surgery/procedure.  Patient denies any fever, chills, sore throat, cough, shortness of breath, chest pain, palpitation, diarrhea, constipation, abdominal pain, headache or other relevant systemic symptoms.    Preop Questions 5/30/2022   1. Have you ever had a heart attack or stroke? No   2. Have you ever had  surgery on your heart or blood vessels, such as a stent placement, a coronary artery bypass, or surgery on an artery in your head, neck, heart, or legs? No   3. Do you have chest pain with activity? No   4. Do you have a history of  heart failure? No   5. Do you currently have a cold, bronchitis or symptoms of other infection? No   6. Do you have a cough, shortness of breath, or wheezing? No   7. Do you or anyone in your family have previous history of blood clots? No   8. Do you or does anyone in your family have a serious bleeding problem such as prolonged bleeding following surgeries or cuts? No   9. Have you ever had problems with anemia or been told to take iron pills? No   10. Have you had any abnormal blood loss such as black, tarry or bloody stools, or abnormal vaginal bleeding? No   11. Have you ever had a blood transfusion? No   12. Are you willing to have a blood transfusion if it is medically needed before, during, or after your surgery? Yes   13. Have you or any of your relatives ever had problems with anesthesia? YES -    14. Do you have sleep apnea, excessive snoring or daytime drowsiness? No   15. Do you have any artifical heart valves or other implanted medical devices like a pacemaker, defibrillator, or continuous glucose monitor? No   16. Do you have artificial joints? No   17. Are you allergic to latex? No   18. Is there any chance that you may be pregnant? No       Health Care Directive:  Patient does not have a Health Care Directive or Living Will: Discussed advance care planning with patient; information given to patient to review.    Preoperative Review of :   reviewed - no record of controlled substances prescribed.        Review of Systems  Constitutional, neuro, ENT, endocrine, pulmonary, cardiac, gastrointestinal, genitourinary, musculoskeletal, integument and psychiatric systems are negative, except as otherwise noted.    Patient Active Problem List    Diagnosis Date Noted      Breast lump 09/19/2012     Priority: Medium     CARDIOVASCULAR SCREENING; LDL GOAL LESS THAN 160 10/31/2010     Priority: Medium      Past Medical History:   Diagnosis Date     Abnormal Pap smear of cervix remote past    ?details?     Endometriosis      Endometriosis 08/18/2009     Hypertension 10yrs    Prehypertension. not on meds     PONV (postoperative nausea and vomiting)      Past Surgical History:   Procedure Laterality Date     APPENDECTOMY       BIOPSY BREAST       COLONOSCOPY  11/2019    Family history, on 3yr plan     GYN SURGERY      hysterectomy     HYSTERECTOMY, PAP NO LONGER INDICATED       PELVIS LAPAROSCOPY,DX      2003,04,06     Current Outpatient Medications   Medication Sig Dispense Refill     MULTIVITAMIN TABS   OR 1 TABLET BY MOUTH DAILY       psyllium (METAMUCIL/KONSYL) 58.6 % powder Take by mouth daily       methocarbamol (ROBAXIN) 500 MG tablet  (Patient not taking: Reported on 5/31/2022)         Allergies   Allergen Reactions     Augmentin Nausea and Vomiting and Diarrhea     Vicodin [Hydrocodone-Acetaminophen] Itching        Social History     Tobacco Use     Smoking status: Never Smoker     Smokeless tobacco: Never Used   Substance Use Topics     Alcohol use: Yes     Comment: 1 drink per month     Family History   Problem Relation Age of Onset     Gynecology Mother         hysterectomy- pre ca     Hypertension Mother      Cancer Father         colon, prostate and kidney     Colon Cancer Father      Prostate Cancer Father      Dementia Maternal Grandmother      Osteoporosis Maternal Grandmother      Cancer Maternal Grandfather      Colon Cancer Maternal Grandfather      Cancer Paternal Grandfather      Colon Cancer Paternal Grandfather      Colon Polyps Sister      Hirschsprung's Disease Nephew      Breast Cancer Other      Cancer Other         stomach/esophogus     Other Cancer Other         Stomach     Myocardial Infarction Paternal Great-Grandfather      Myocardial Infarction Paternal  "Great-Grandmother      Cancer Maternal Great-Grandfather         Lung and Colon     Hypertension Maternal Great-Grandfather      History   Drug Use No         Objective     BP (!) 140/88 (BP Location: Right arm, Patient Position: Sitting, Cuff Size: Adult Large)   Pulse 82   Temp 97.5  F (36.4  C) (Tympanic)   Resp 16   Ht 1.702 m (5' 7\")   Wt 89.5 kg (197 lb 6.4 oz)   LMP  (LMP Unknown)   SpO2 98%   Breastfeeding No   BMI 30.92 kg/m      Physical Exam    GENERAL APPEARANCE: healthy, alert and no distress     EYES: EOMI, PERRL     HENT: ear canals and TM's normal and nose and mouth without ulcers or lesions     NECK: no adenopathy, no asymmetry, masses, or scars and thyroid normal to palpation     RESP: lungs clear to auscultation - no rales, rhonchi or wheezes     CV: regular rates and rhythm, normal S1 S2, no S3 or S4 and no murmur, click or rub     ABDOMEN:  soft, nontender, no HSM or masses and bowel sounds normal     MS: extremities normal- no gross deformities noted, no evidence of inflammation in joints, FROM in all extremities.     SKIN: no suspicious lesions or rashes     NEURO: Normal strength and tone, sensory exam grossly normal, mentation intact and speech normal     PSYCH: mentation appears normal. and affect normal/bright     LYMPHATICS: No cervical adenopathy    No results for input(s): HGB, PLT, INR, NA, POTASSIUM, CR, A1C in the last 18490 hours.     Diagnostics:  No labs were ordered during this visit.   No EKG required for low risk surgery (cataract, skin procedure, breast biopsy, etc).    Revised Cardiac Risk Index (RCRI):  The patient has the following serious cardiovascular risks for perioperative complications:   - No serious cardiac risks = 0 points     RCRI Interpretation: 0 points: Class I (very low risk - 0.4% complication rate)           Signed Electronically by: Jose M Jimenez MD  Copy of this evaluation report is provided to requesting physician.      "

## 2022-05-31 NOTE — PATIENT INSTRUCTIONS
Preparing for Your Surgery  Getting started  A nurse will call you to review your health history and instructions. They will give you an arrival time based on your scheduled surgery time. Please be ready to share:    Your doctor's clinic name and phone number    Your medical, surgical and anesthesia history    A list of allergies and sensitivities    A list of medicines, including herbal treatments and over-the-counter drugs    Whether the patient has a legal guardian (ask how to send us the papers in advance)  Please tell us if you're pregnant--or if there's any chance you might be pregnant. Some surgeries may injure a fetus (unborn baby), so they require a pregnancy test. Surgeries that are safe for a fetus don't always need a test, and you can choose whether to have one.   If you have a child who's having surgery, please ask for a copy of Preparing for Your Child's Surgery.    Preparing for surgery    Within 30 days of surgery: Have a pre-op exam (sometimes called an H&P, or History and Physical). This can be done at a clinic or pre-operative center.  ? If you're having a , you may not need this exam. Talk to your care team.    At your pre-op exam, talk to your care team about all medicines you take. If you need to stop any medicines before surgery, ask when to start taking them again.  ? We do this for your safety. Many medicines can make you bleed too much during surgery. Some change how well surgery (anesthesia) drugs work.    Call your insurance company to let them know you're having surgery. (If you don't have insurance, call [phone].)    Call your clinic if there's any change in your health. This includes signs of a cold or flu (sore throat, runny nose, cough, rash, fever). It also includes a scrape or scratch near the surgery site.    If you have questions on the day of surgery, call your hospital or surgery center.  COVID testing  You may need to be tested for COVID-19 before having  surgery. If so, we will give you instructions.  Eating and drinking guidelines  For your safety: Unless your surgeon tells you otherwise, follow the guidelines below.    Eat and drink as usual until 8 hours before surgery. After that, no food or milk.    Drink clear liquids until 2 hours before surgery. These are liquids you can see through, like water, Gatorade and Propel Water. You may also have black coffee and tea (no cream or milk).    Nothing by mouth within 2 hours of surgery. This includes gum, candy and breath mints.    If you drink alcohol: Stop drinking it the night before surgery.    If your care team tells you to take medicine on the morning of surgery, it's okay to take it with a sip of water.  Preventing infection    Shower or bathe the night before and morning of your surgery. Follow the instructions your clinic gave you. (If no instructions, use regular soap.)    Don't shave or clip hair near your surgery site. We'll remove the hair if needed.    Don't smoke or vape the morning of surgery. You may chew nicotine gum up to 2 hours before surgery. A nicotine patch is okay.  ? Note: Some surgeries require you to completely quit smoking and nicotine. Check with your surgeon.    Your care team will make every effort to keep you safe from infection. We will:  ? Clean our hands often with soap and water (or an alcohol-based hand rub).  ? Clean the skin at your surgery site with a special soap that kills germs.  ? Give you a special gown to keep you warm. (Cold raises the risk of infection.)  ? Wear special hair covers, masks, gowns and gloves during surgery.  ? Give antibiotic medicine, if prescribed. Not all surgeries need antibiotics.  What to bring on the day of surgery    Photo ID and insurance card    Copy of your health care directive, if you have one    Glasses and hearing aides (bring cases)  ? You can't wear contacts during surgery    Inhaler and eye drops, if you use them (tell us about these when  you arrive)    CPAP machine or breathing device, if you use them    A few personal items, if spending the night    If you have . . .  ? A pacemaker, ICD (cardiac defibrillator) or other implant: Bring the ID card.  ? An implanted stimulator: Bring the remote control.  ? A legal guardian: Bring a copy of the certified (court-stamped) guardianship papers.  Please remove any jewelry, including body piercings. Leave jewelry and other valuables at home.  If you're going home the day of surgery    You must have a responsible adult drive you home. They should stay with you overnight as well.    If you don't have someone to stay with you, and you aren't safe to go home alone, we may keep you overnight. Insurance often won't pay for this.  After surgery  If it's hard to control your pain or you need more pain medicine, please call your surgeon's office.  Questions?   If you have any questions for your care team, list them here: _________________________________________________________________________________________________________________________________________________________________________ ____________________________________ ____________________________________ ____________________________________  For informational purposes only. Not to replace the advice of your health care provider. Copyright   2003, 2019 [facility]. All rights reserved. Clinically reviewed by Cindy Perkins MD. [employer] [id number] - REV 07/21.

## 2022-06-03 ENCOUNTER — ANESTHESIA EVENT (OUTPATIENT)
Dept: SURGERY | Facility: CLINIC | Age: 47
End: 2022-06-03
Payer: COMMERCIAL

## 2022-06-03 ENCOUNTER — LAB (OUTPATIENT)
Dept: LAB | Facility: CLINIC | Age: 47
End: 2022-06-03
Payer: COMMERCIAL

## 2022-06-03 DIAGNOSIS — Z11.59 ENCOUNTER FOR SCREENING FOR OTHER VIRAL DISEASES: ICD-10-CM

## 2022-06-03 PROCEDURE — U0003 INFECTIOUS AGENT DETECTION BY NUCLEIC ACID (DNA OR RNA); SEVERE ACUTE RESPIRATORY SYNDROME CORONAVIRUS 2 (SARS-COV-2) (CORONAVIRUS DISEASE [COVID-19]), AMPLIFIED PROBE TECHNIQUE, MAKING USE OF HIGH THROUGHPUT TECHNOLOGIES AS DESCRIBED BY CMS-2020-01-R: HCPCS

## 2022-06-03 PROCEDURE — U0005 INFEC AGEN DETEC AMPLI PROBE: HCPCS

## 2022-06-03 NOTE — ANESTHESIA PREPROCEDURE EVALUATION
Anesthesia Pre-Procedure Evaluation    Patient: Rin Garcia   MRN: [id number] : 1975        Procedure : Procedure(s):  Laparoscopy          Past Medical History:   Diagnosis Date     Abnormal Pap smear of cervix remote past    ?details?     Endometriosis      Endometriosis 2009     Hypertension 10yrs    Prehypertension. not on meds     PONV (postoperative nausea and vomiting)       Past Surgical History:   Procedure Laterality Date     APPENDECTOMY       BIOPSY BREAST       COLONOSCOPY  2019    Family history, on 3yr plan     GYN SURGERY      hysterectomy     HYSTERECTOMY, PAP NO LONGER INDICATED       PELVIS LAPAROSCOPY,DX      ,,      Allergies   Allergen Reactions     Augmentin Nausea and Vomiting and Diarrhea     Vicodin [Hydrocodone-Acetaminophen] Itching      Social History     Tobacco Use     Smoking status: Never Smoker     Smokeless tobacco: Never Used   Substance Use Topics     Alcohol use: Yes     Comment: 1 drink per month      Wt Readings from Last 1 Encounters:   22 89.5 kg (197 lb 6.4 oz)        Anesthesia Evaluation   Pt has had prior anesthetic. Type: General and MAC.    History of anesthetic complications  - PONV.      ROS/MED HX  ENT/Pulmonary:       Neurologic:       Cardiovascular:     (+) hypertension-----    METS/Exercise Tolerance:     Hematologic:       Musculoskeletal:       GI/Hepatic:       Renal/Genitourinary:       Endo:       Psychiatric/Substance Use:       Infectious Disease:       Malignancy:       Other: Comment: Endometriosis  Pelvic pain             Physical Exam    Airway        Mallampati: I   TM distance: > 3 FB   Neck ROM: full   Mouth opening: > 3 cm    Respiratory Devices and Support         Dental  no notable dental history         Cardiovascular   cardiovascular exam normal          Pulmonary   pulmonary exam normal                OUTSIDE LABS:  CBC:   Lab Results   Component Value Date    WBC 11.6 (H) 2010    WBC 12.8 (H)  01/08/2010    HGB 14.0 01/13/2010    HGB 11.9 01/08/2010    HCT 41.3 01/13/2010    HCT 36.1 01/08/2010     01/13/2010     01/08/2010     BMP:   Lab Results   Component Value Date     01/13/2010     08/20/2006    POTASSIUM 4.3 01/13/2010    POTASSIUM 4.4 08/20/2006    CHLORIDE 101 01/13/2010    CHLORIDE 99 08/20/2006    CO2 28 01/13/2010    CO2 28 08/20/2006    BUN 13 01/13/2010    BUN 11 08/20/2006    CR 0.70 01/13/2010    CR 0.85 08/20/2006     (H) 01/13/2010    GLC 93 08/20/2006     COAGS: No results found for: PTT, INR, FIBR  POC:   Lab Results   Component Value Date    HCG  01/07/2010     Negative   This test provides a presumptive diagnosis of pregnancy or non-pregnancy. A   confirmed pregnancy diagnosis should only be made by a physician after all   clinical and laboratory findings have been evaluated.     HEPATIC:   Lab Results   Component Value Date    ALBUMIN 4.8 (H) 08/20/2006    PROTTOTAL 9.1 (H) 08/20/2006    ALT 18 08/20/2006    AST 28 08/20/2006    ALKPHOS 80 08/20/2006    BILITOTAL 0.6 08/20/2006     OTHER:   Lab Results   Component Value Date    SYDNEE 9.8 01/13/2010    LIPASE 118 08/20/2006       Anesthesia Plan    ASA Status:  2   NPO Status:  NPO Appropriate    Anesthesia Type: General.     - Airway: ETT   Induction: Intravenous, Propofol.   Maintenance: TIVA.        Consents    Anesthesia Plan(s) and associated risks, benefits, and realistic alternatives discussed. Questions answered and patient/representative(s) expressed understanding.     - Discussed: Risks, Benefits and Alternatives for BOTH SEDATION and the PROCEDURE were discussed     - Discussed with:  Patient      - Extended Intubation/Ventilatory Support Discussed: No.      - Patient is DNR/DNI Status: No    Use of blood products discussed: No .     Postoperative Care    Pain management: IV analgesics, Oral pain medications, Multi-modal analgesia.   PONV prophylaxis: Ondansetron (or other 5HT-3),  Dexamethasone or Solumedrol, Background Propofol Infusion     Comments:                JONATHAN Brambila CRNA

## 2022-06-04 LAB — SARS-COV-2 RNA RESP QL NAA+PROBE: NEGATIVE

## 2022-06-06 ENCOUNTER — ANESTHESIA (OUTPATIENT)
Dept: SURGERY | Facility: CLINIC | Age: 47
End: 2022-06-06
Payer: COMMERCIAL

## 2022-06-06 ENCOUNTER — HOSPITAL ENCOUNTER (OUTPATIENT)
Facility: CLINIC | Age: 47
Discharge: HOME | End: 2022-06-06
Attending: OBSTETRICS & GYNECOLOGY | Admitting: OBSTETRICS & GYNECOLOGY
Payer: COMMERCIAL

## 2022-06-06 VITALS
HEIGHT: 67 IN | RESPIRATION RATE: 16 BRPM | BODY MASS INDEX: 30.45 KG/M2 | SYSTOLIC BLOOD PRESSURE: 135 MMHG | DIASTOLIC BLOOD PRESSURE: 97 MMHG | HEART RATE: 75 BPM | OXYGEN SATURATION: 98 % | WEIGHT: 194 LBS | TEMPERATURE: 97.5 F

## 2022-06-06 DIAGNOSIS — Z98.890 S/P LAPAROSCOPIC PROCEDURE: Primary | ICD-10-CM

## 2022-06-06 DIAGNOSIS — R03.0 ELEVATED BLOOD-PRESSURE READING WITHOUT DIAGNOSIS OF HYPERTENSION: ICD-10-CM

## 2022-06-06 DIAGNOSIS — Z80.3 FAMILY HISTORY OF MALIGNANT NEOPLASM OF BREAST: ICD-10-CM

## 2022-06-06 DIAGNOSIS — R10.2 PELVIC PAIN IN FEMALE: ICD-10-CM

## 2022-06-06 DIAGNOSIS — N95.1 MENOPAUSAL SYNDROME (HOT FLASHES): ICD-10-CM

## 2022-06-06 PROCEDURE — 250N000011 HC RX IP 250 OP 636: Performed by: OBSTETRICS & GYNECOLOGY

## 2022-06-06 PROCEDURE — 250N000011 HC RX IP 250 OP 636: Performed by: NURSE ANESTHETIST, CERTIFIED REGISTERED

## 2022-06-06 PROCEDURE — 88305 TISSUE EXAM BY PATHOLOGIST: CPT | Mod: 26 | Performed by: PATHOLOGY

## 2022-06-06 PROCEDURE — 88305 TISSUE EXAM BY PATHOLOGIST: CPT | Mod: TC | Performed by: OBSTETRICS & GYNECOLOGY

## 2022-06-06 PROCEDURE — 370N000017 HC ANESTHESIA TECHNICAL FEE, PER MIN: Performed by: OBSTETRICS & GYNECOLOGY

## 2022-06-06 PROCEDURE — 710N000012 HC RECOVERY PHASE 2, PER MINUTE: Performed by: OBSTETRICS & GYNECOLOGY

## 2022-06-06 PROCEDURE — 710N000009 HC RECOVERY PHASE 1, LEVEL 1, PER MIN: Performed by: OBSTETRICS & GYNECOLOGY

## 2022-06-06 PROCEDURE — 250N000009 HC RX 250: Performed by: OBSTETRICS & GYNECOLOGY

## 2022-06-06 PROCEDURE — 999N000141 HC STATISTIC PRE-PROCEDURE NURSING ASSESSMENT: Performed by: OBSTETRICS & GYNECOLOGY

## 2022-06-06 PROCEDURE — 58661 LAPAROSCOPY REMOVE ADNEXA: CPT | Performed by: OBSTETRICS & GYNECOLOGY

## 2022-06-06 PROCEDURE — 360N000076 HC SURGERY LEVEL 3, PER MIN: Performed by: OBSTETRICS & GYNECOLOGY

## 2022-06-06 PROCEDURE — 250N000013 HC RX MED GY IP 250 OP 250 PS 637: Performed by: NURSE ANESTHETIST, CERTIFIED REGISTERED

## 2022-06-06 PROCEDURE — 250N000009 HC RX 250: Performed by: NURSE ANESTHETIST, CERTIFIED REGISTERED

## 2022-06-06 PROCEDURE — 258N000003 HC RX IP 258 OP 636: Performed by: NURSE ANESTHETIST, CERTIFIED REGISTERED

## 2022-06-06 PROCEDURE — 272N000001 HC OR GENERAL SUPPLY STERILE: Performed by: OBSTETRICS & GYNECOLOGY

## 2022-06-06 PROCEDURE — 250N000013 HC RX MED GY IP 250 OP 250 PS 637: Performed by: OBSTETRICS & GYNECOLOGY

## 2022-06-06 PROCEDURE — 58661 LAPAROSCOPY REMOVE ADNEXA: CPT | Mod: AS | Performed by: PHYSICIAN ASSISTANT

## 2022-06-06 RX ORDER — NALOXONE HYDROCHLORIDE 0.4 MG/ML
0.2 INJECTION, SOLUTION INTRAMUSCULAR; INTRAVENOUS; SUBCUTANEOUS
Status: DISCONTINUED | OUTPATIENT
Start: 2022-06-06 | End: 2022-06-06 | Stop reason: HOSPADM

## 2022-06-06 RX ORDER — ALBUTEROL SULFATE 0.83 MG/ML
2.5 SOLUTION RESPIRATORY (INHALATION)
Status: DISCONTINUED | OUTPATIENT
Start: 2022-06-06 | End: 2022-06-06 | Stop reason: HOSPADM

## 2022-06-06 RX ORDER — HYDROMORPHONE HCL IN WATER/PF 6 MG/30 ML
0.4 PATIENT CONTROLLED ANALGESIA SYRINGE INTRAVENOUS EVERY 5 MIN PRN
Status: DISCONTINUED | OUTPATIENT
Start: 2022-06-06 | End: 2022-06-06 | Stop reason: HOSPADM

## 2022-06-06 RX ORDER — PROPOFOL 10 MG/ML
INJECTION, EMULSION INTRAVENOUS CONTINUOUS PRN
Status: DISCONTINUED | OUTPATIENT
Start: 2022-06-06 | End: 2022-06-06

## 2022-06-06 RX ORDER — MAGNESIUM SULFATE HEPTAHYDRATE 40 MG/ML
2 INJECTION, SOLUTION INTRAVENOUS ONCE
Status: COMPLETED | OUTPATIENT
Start: 2022-06-06 | End: 2022-06-06

## 2022-06-06 RX ORDER — NALOXONE HYDROCHLORIDE 0.4 MG/ML
0.4 INJECTION, SOLUTION INTRAMUSCULAR; INTRAVENOUS; SUBCUTANEOUS
Status: DISCONTINUED | OUTPATIENT
Start: 2022-06-06 | End: 2022-06-06 | Stop reason: HOSPADM

## 2022-06-06 RX ORDER — KETAMINE HYDROCHLORIDE 10 MG/ML
INJECTION INTRAMUSCULAR; INTRAVENOUS PRN
Status: DISCONTINUED | OUTPATIENT
Start: 2022-06-06 | End: 2022-06-06

## 2022-06-06 RX ORDER — LIDOCAINE 40 MG/G
CREAM TOPICAL
Status: DISCONTINUED | OUTPATIENT
Start: 2022-06-06 | End: 2022-06-06 | Stop reason: HOSPADM

## 2022-06-06 RX ORDER — PROPOFOL 10 MG/ML
INJECTION, EMULSION INTRAVENOUS PRN
Status: DISCONTINUED | OUTPATIENT
Start: 2022-06-06 | End: 2022-06-06

## 2022-06-06 RX ORDER — OXYCODONE HYDROCHLORIDE 5 MG/1
5 TABLET ORAL EVERY 4 HOURS PRN
Status: DISCONTINUED | OUTPATIENT
Start: 2022-06-06 | End: 2022-06-06 | Stop reason: HOSPADM

## 2022-06-06 RX ORDER — LIDOCAINE HYDROCHLORIDE 10 MG/ML
INJECTION, SOLUTION INFILTRATION; PERINEURAL PRN
Status: DISCONTINUED | OUTPATIENT
Start: 2022-06-06 | End: 2022-06-06

## 2022-06-06 RX ORDER — ACETAMINOPHEN 325 MG/1
975 TABLET ORAL ONCE
Status: CANCELLED | OUTPATIENT
Start: 2022-06-06 | End: 2022-06-06

## 2022-06-06 RX ORDER — HYDROXYZINE HYDROCHLORIDE 25 MG/1
25 TABLET, FILM COATED ORAL
Status: CANCELLED | OUTPATIENT
Start: 2022-06-06

## 2022-06-06 RX ORDER — PHENAZOPYRIDINE HYDROCHLORIDE 200 MG/1
200 TABLET, FILM COATED ORAL ONCE
Status: COMPLETED | OUTPATIENT
Start: 2022-06-06 | End: 2022-06-06

## 2022-06-06 RX ORDER — KETOROLAC TROMETHAMINE 30 MG/ML
INJECTION, SOLUTION INTRAMUSCULAR; INTRAVENOUS PRN
Status: DISCONTINUED | OUTPATIENT
Start: 2022-06-06 | End: 2022-06-06

## 2022-06-06 RX ORDER — IBUPROFEN 800 MG/1
800 TABLET, FILM COATED ORAL ONCE
Status: CANCELLED | OUTPATIENT
Start: 2022-06-06 | End: 2022-06-06

## 2022-06-06 RX ORDER — SCOLOPAMINE TRANSDERMAL SYSTEM 1 MG/1
1 PATCH, EXTENDED RELEASE TRANSDERMAL
Status: DISCONTINUED | OUTPATIENT
Start: 2022-06-06 | End: 2022-06-06 | Stop reason: HOSPADM

## 2022-06-06 RX ORDER — ONDANSETRON 2 MG/ML
4 INJECTION INTRAMUSCULAR; INTRAVENOUS EVERY 30 MIN PRN
Status: DISCONTINUED | OUTPATIENT
Start: 2022-06-06 | End: 2022-06-06 | Stop reason: HOSPADM

## 2022-06-06 RX ORDER — BUPIVACAINE HYDROCHLORIDE 2.5 MG/ML
INJECTION, SOLUTION INFILTRATION; PERINEURAL PRN
Status: DISCONTINUED | OUTPATIENT
Start: 2022-06-06 | End: 2022-06-06 | Stop reason: HOSPADM

## 2022-06-06 RX ORDER — ONDANSETRON 2 MG/ML
INJECTION INTRAMUSCULAR; INTRAVENOUS PRN
Status: DISCONTINUED | OUTPATIENT
Start: 2022-06-06 | End: 2022-06-06

## 2022-06-06 RX ORDER — DEXAMETHASONE SODIUM PHOSPHATE 4 MG/ML
INJECTION, SOLUTION INTRA-ARTICULAR; INTRALESIONAL; INTRAMUSCULAR; INTRAVENOUS; SOFT TISSUE PRN
Status: DISCONTINUED | OUTPATIENT
Start: 2022-06-06 | End: 2022-06-06

## 2022-06-06 RX ORDER — MEPERIDINE HYDROCHLORIDE 25 MG/ML
12.5 INJECTION INTRAMUSCULAR; INTRAVENOUS; SUBCUTANEOUS
Status: DISCONTINUED | OUTPATIENT
Start: 2022-06-06 | End: 2022-06-06 | Stop reason: HOSPADM

## 2022-06-06 RX ORDER — GABAPENTIN 300 MG/1
300 CAPSULE ORAL
Status: COMPLETED | OUTPATIENT
Start: 2022-06-06 | End: 2022-06-06

## 2022-06-06 RX ORDER — CEFAZOLIN SODIUM/WATER 2 G/20 ML
2 SYRINGE (ML) INTRAVENOUS SEE ADMIN INSTRUCTIONS
Status: DISCONTINUED | OUTPATIENT
Start: 2022-06-06 | End: 2022-06-06 | Stop reason: HOSPADM

## 2022-06-06 RX ORDER — BUPIVACAINE HYDROCHLORIDE AND EPINEPHRINE 5; 5 MG/ML; UG/ML
INJECTION, SOLUTION EPIDURAL; INTRACAUDAL; PERINEURAL PRN
Status: DISCONTINUED | OUTPATIENT
Start: 2022-06-06 | End: 2022-06-06 | Stop reason: HOSPADM

## 2022-06-06 RX ORDER — OXYCODONE HYDROCHLORIDE 5 MG/1
5 TABLET ORAL
Status: CANCELLED | OUTPATIENT
Start: 2022-06-06

## 2022-06-06 RX ORDER — MEPERIDINE HYDROCHLORIDE 50 MG/ML
INJECTION INTRAMUSCULAR; INTRAVENOUS; SUBCUTANEOUS PRN
Status: DISCONTINUED | OUTPATIENT
Start: 2022-06-06 | End: 2022-06-06

## 2022-06-06 RX ORDER — HYDROXYZINE HYDROCHLORIDE 25 MG/1
25 TABLET, FILM COATED ORAL EVERY 6 HOURS PRN
Status: DISCONTINUED | OUTPATIENT
Start: 2022-06-06 | End: 2022-06-06 | Stop reason: HOSPADM

## 2022-06-06 RX ORDER — FENTANYL CITRATE 50 UG/ML
50 INJECTION, SOLUTION INTRAMUSCULAR; INTRAVENOUS EVERY 5 MIN PRN
Status: DISCONTINUED | OUTPATIENT
Start: 2022-06-06 | End: 2022-06-06 | Stop reason: HOSPADM

## 2022-06-06 RX ORDER — SODIUM CHLORIDE, SODIUM LACTATE, POTASSIUM CHLORIDE, CALCIUM CHLORIDE 600; 310; 30; 20 MG/100ML; MG/100ML; MG/100ML; MG/100ML
INJECTION, SOLUTION INTRAVENOUS CONTINUOUS
Status: DISCONTINUED | OUTPATIENT
Start: 2022-06-06 | End: 2022-06-06 | Stop reason: HOSPADM

## 2022-06-06 RX ORDER — IBUPROFEN 200 MG
800 TABLET ORAL EVERY 6 HOURS PRN
COMMUNITY
Start: 2022-06-06 | End: 2023-04-14

## 2022-06-06 RX ORDER — OXYCODONE HYDROCHLORIDE 5 MG/1
5-10 TABLET ORAL EVERY 4 HOURS PRN
Qty: 10 TABLET | Refills: 0 | Status: SHIPPED | OUTPATIENT
Start: 2022-06-06 | End: 2023-04-14

## 2022-06-06 RX ORDER — ONDANSETRON 4 MG/1
4 TABLET, ORALLY DISINTEGRATING ORAL EVERY 30 MIN PRN
Status: DISCONTINUED | OUTPATIENT
Start: 2022-06-06 | End: 2022-06-06 | Stop reason: HOSPADM

## 2022-06-06 RX ORDER — HYDROXYZINE HYDROCHLORIDE 50 MG/1
50 TABLET, FILM COATED ORAL EVERY 6 HOURS PRN
Status: DISCONTINUED | OUTPATIENT
Start: 2022-06-06 | End: 2022-06-06 | Stop reason: HOSPADM

## 2022-06-06 RX ORDER — FENTANYL CITRATE 50 UG/ML
INJECTION, SOLUTION INTRAMUSCULAR; INTRAVENOUS PRN
Status: DISCONTINUED | OUTPATIENT
Start: 2022-06-06 | End: 2022-06-06

## 2022-06-06 RX ORDER — ACETAMINOPHEN 325 MG/1
975 TABLET ORAL ONCE
Status: COMPLETED | OUTPATIENT
Start: 2022-06-06 | End: 2022-06-06

## 2022-06-06 RX ORDER — ACETAMINOPHEN 325 MG/1
975 TABLET ORAL EVERY 6 HOURS PRN
Qty: 50 TABLET | Refills: 0 | COMMUNITY
Start: 2022-06-06 | End: 2023-04-14

## 2022-06-06 RX ORDER — CEFAZOLIN SODIUM/WATER 2 G/20 ML
2 SYRINGE (ML) INTRAVENOUS
Status: COMPLETED | OUTPATIENT
Start: 2022-06-06 | End: 2022-06-06

## 2022-06-06 RX ORDER — FENTANYL CITRATE 50 UG/ML
25 INJECTION, SOLUTION INTRAMUSCULAR; INTRAVENOUS
Status: DISCONTINUED | OUTPATIENT
Start: 2022-06-06 | End: 2022-06-06 | Stop reason: HOSPADM

## 2022-06-06 RX ADMIN — FENTANYL CITRATE 100 MCG: 50 INJECTION, SOLUTION INTRAMUSCULAR; INTRAVENOUS at 11:34

## 2022-06-06 RX ADMIN — Medication 2 G: at 11:23

## 2022-06-06 RX ADMIN — KETAMINE HYDROCHLORIDE 20 MG: 10 INJECTION, SOLUTION INTRAMUSCULAR; INTRAVENOUS at 12:33

## 2022-06-06 RX ADMIN — PROPOFOL 50 MG: 10 INJECTION, EMULSION INTRAVENOUS at 12:39

## 2022-06-06 RX ADMIN — SODIUM CHLORIDE, POTASSIUM CHLORIDE, SODIUM LACTATE AND CALCIUM CHLORIDE: 600; 310; 30; 20 INJECTION, SOLUTION INTRAVENOUS at 09:58

## 2022-06-06 RX ADMIN — GABAPENTIN 300 MG: 300 CAPSULE ORAL at 09:59

## 2022-06-06 RX ADMIN — SCOPALAMINE 1 PATCH: 1 PATCH, EXTENDED RELEASE TRANSDERMAL at 11:23

## 2022-06-06 RX ADMIN — PROPOFOL 150 MCG/KG/MIN: 10 INJECTION, EMULSION INTRAVENOUS at 11:35

## 2022-06-06 RX ADMIN — KETOROLAC TROMETHAMINE 30 MG: 30 INJECTION, SOLUTION INTRAMUSCULAR at 12:30

## 2022-06-06 RX ADMIN — MIDAZOLAM 2 MG: 1 INJECTION INTRAMUSCULAR; INTRAVENOUS at 11:23

## 2022-06-06 RX ADMIN — ROCURONIUM BROMIDE 40 MG: 50 INJECTION, SOLUTION INTRAVENOUS at 11:34

## 2022-06-06 RX ADMIN — DEXAMETHASONE SODIUM PHOSPHATE 8 MG: 4 INJECTION, SOLUTION INTRA-ARTICULAR; INTRALESIONAL; INTRAMUSCULAR; INTRAVENOUS; SOFT TISSUE at 11:34

## 2022-06-06 RX ADMIN — KETAMINE HYDROCHLORIDE 30 MG: 10 INJECTION, SOLUTION INTRAMUSCULAR; INTRAVENOUS at 11:41

## 2022-06-06 RX ADMIN — PROPOFOL 200 MG: 10 INJECTION, EMULSION INTRAVENOUS at 11:34

## 2022-06-06 RX ADMIN — LIDOCAINE HYDROCHLORIDE 50 MG: 10 INJECTION, SOLUTION INFILTRATION; PERINEURAL at 11:34

## 2022-06-06 RX ADMIN — ONDANSETRON 4 MG: 2 INJECTION INTRAMUSCULAR; INTRAVENOUS at 12:29

## 2022-06-06 RX ADMIN — ACETAMINOPHEN 975 MG: 325 TABLET ORAL at 09:59

## 2022-06-06 RX ADMIN — OXYCODONE HYDROCHLORIDE 5 MG: 5 TABLET ORAL at 13:34

## 2022-06-06 RX ADMIN — MAGNESIUM SULFATE HEPTAHYDRATE 2 G: 40 INJECTION, SOLUTION INTRAVENOUS at 11:45

## 2022-06-06 RX ADMIN — MEPERIDINE HYDROCHLORIDE 25 MG: 50 INJECTION, SOLUTION INTRAMUSCULAR; INTRAVENOUS; SUBCUTANEOUS at 11:47

## 2022-06-06 RX ADMIN — PHENAZOPYRIDINE HYDROCHLORIDE 200 MG: 200 TABLET ORAL at 09:59

## 2022-06-06 RX ADMIN — LIDOCAINE HYDROCHLORIDE 0.1 ML: 10 INJECTION, SOLUTION EPIDURAL; INFILTRATION; INTRACAUDAL; PERINEURAL at 09:58

## 2022-06-06 NOTE — INTERVAL H&P NOTE
"I have reviewed the surgical (or preoperative) H&P that is linked to this encounter, and examined the patient. There are no significant changes    Clinical Conditions Present on Arrival:  Clinically Significant Risk Factors Present on Admission                   # Obesity: Estimated body mass index is 30.38 kg/m  as calculated from the following:    Height as of this encounter: 1.702 m (5' 7\").    Weight as of this encounter: 88 kg (194 lb).       "

## 2022-06-06 NOTE — DISCHARGE INSTRUCTIONS
Same Day Surgery Discharge Instructions  Special Precautions After Surgery - Adult    It is not unusual to feel lightheaded or faint, up to 24 hours after surgery or while taking pain medication.  If you have these symptoms; sit for a few minutes before standing and have someone assist you when getting up.  You should rest and relax for the next 24 hours and must have someone stay with you for at least 24 hours after your discharge.  DO NOT DRIVE any vehicle or operate mechanical equipment for 24 hours following the end of your surgery.  DO NOT DRIVE while taking narcotic pain medications that have been prescribed by your physician.  If you had a limb operated on, you must be able to use it fully to drive.  DO NOT drink alcoholic beverages for 24 hours following surgery or while taking prescription pain medication.  Drink clear liquids (apple juice, ginger ale, broth, 7-Up, etc.).  Progress to your regular diet as you feel able.  Any questions call your physician and do not make important decisions for 24 hours      Nausea and Vomiting: Nausea and vomiting can occur any time after receiving anesthesia. If you experience nausea and vomiting we encourage you to move to a clear liquid diet and advance your diet as tolerated. If nausea and vomiting do not improve within 12 hours please call the surgeon or present to the Emergency department.     Break-through Bleeding: If your experience bleeding from your surgical site apply pressure and additional dressing per nurse instruction. For simple problems such as a saturated dressing, you may need to reinforce the dressing with more gauze and tape and put slight pressure on the site. If bleeding does not subside contact the surgeon or present to the Emergency Department.    Post-op Infection: If you develop a fever of 100.4 or greater, have pus like drainage, redness, swelling or severe pain at the surgical site not alleviated with pain medications;  please contact the surgeon or present to the Emergency Department.  Nurse Advice Line: [phone]    __________________________________________________________________________________________________________________________________  IMPORTANT NUMBERS:    [facility] Main Number:  [phone], [phone]  Pharmacy:  [phone]  Same Day Surgery:  [phone], Monday - Friday until 8:30 p.m.  Urgent Care:  [phone]  Emergency Room:  [phone]      [facility]:  [phone]                                                                             [facility]:  [phone] option [address] [place] [address] Orthopedics:  [phone]     OB Clinic:  [phone]   Surgery Specialty Clinic:  [phone]   Home Medical Equipment: [phone]  [facility]:  [phone]

## 2022-06-06 NOTE — ANESTHESIA CARE TRANSFER NOTE
Patient: Rin Garcia    Procedure: Procedure(s):  diagnostic Laparoscopy Right oopherectomy and lysis of adhesion       Diagnosis: Pelvic pain in female [R10.2]  Menopausal syndrome (hot flashes) [N95.1]  Family history of malignant neoplasm of breast [Z80.3]  Elevated blood-pressure reading without diagnosis of hypertension [R03.0]  Diagnosis Additional Information: No value filed.    Anesthesia Type:   General     Note:    Oropharynx: oropharynx clear of all foreign objects and spontaneously breathing  Level of Consciousness: awake  Oxygen Supplementation: face mask  Level of Supplemental Oxygen (L/min / FiO2): 6  Independent Airway: airway patency satisfactory and stable  Dentition: dentition unchanged  Vital Signs Stable: post-procedure vital signs reviewed and stable  Report to RN Given: handoff report given  Patient transferred to: PACU    Handoff Report: Identifed the Patient, Identified the Reponsible Provider, Reviewed the pertinent medical history, Discussed the surgical course, Reviewed Intra-OP anesthesia mangement and issues during anesthesia, Set expectations for post-procedure period and Allowed opportunity for questions and acknowledgement of understanding      Vitals:  Vitals Value Taken Time   BP     Temp     Pulse     Resp     SpO2         Electronically Signed By: JONATHAN Vega CRNA  June 6, 2022  12:52 PM

## 2022-06-06 NOTE — OP NOTE
[facility]  Operative Note    Pre-operative diagnosis: Pelvic pain in female [R10.2]  Menopausal syndrome (hot flashes) [N95.1]  Family history of malignant neoplasm of breast [Z80.3]  Elevated blood-pressure reading without diagnosis of hypertension [R03.0]   Post-operative diagnosis: Same  Pelvic adhesive disease   Procedure: Laproscopy  Right oophorectomy   Surgeon: Guilherme Juan MD   Assistant(s): Mahad Elaine PA-C  A surgical assistant was required for this surgery for his experience with retraction, achievement of hemostasis, and wound closure     Anesthesia: General Endotracheal Anesthesia   Estimated blood loss: 10 ml   Total IV fluids: (See anesthesia record)  800ml   Blood transfusion: No transfusion was given during surgery   Total urine output: 400 ml   Drains: None   Specimens: Right ovary   Findings: Right ovary with a shallow pedicle   Left ovary densely adherent to the sigmoid colon , normal in size with a stigmata   Complications: None   Condition: Stable   Comments: Rin JANETT Jose   1975  [id number]      Rin Garcia  presented for the above procedure.  She has right sided pelvic pain and a hx of endometriosis , is s/p hysterectomy and Bilateral salpingectomy  I met with Rin  and her SO, Homero and discussed the planned procedure as well as the expected post operative course.  Risks of complications were noted and postoperative signs to watch for outlined.  Questions were answered and consent signed.  She was taken to the [facility] where she was placed in the supine position. She underwent General anesthesia with endotracheal intubation.  She was then placed in the Dorso-lithotomy position in [facility].  An examination under anesthesia was performed that showed: Vaginal cuff well supported.  Uterus and cervix are surgically absent.  No adnexal masses were palpable.    She was prepped and draped.  A timeout was held confirming her  identity and proposed procedures. All were in agreement.      Speculum placed in vagina, vaginal cuff was isolated.  A stick sponge was placed in the vagina for identification.    Attention was then turned to the abdomen.  Each abdominal incision was preinstilled with 0.25% bupivacaine.  Umbilical incision was made and a Veress needle was placed through the incision grasping the anterior abdominal wall for countertraction.  Opening pressure was 4 mmHg.  2.2 L of CO2 were insufflated the Veress was removed and a 5 mm trocar cannula placed without difficulty.  Placement was documented visually.  An 8 mm port was placed in the suprapubic area under direct vision.  A right lower quadrant incision was made and a 5 mm port placed under direct vision.    The right ovary was present with a rather narrow pedicle.  Was normal in appearance.  The left ovary was obscured by the sigmoid colon which was attached to the left abdominal and pelvic sidewall.  Uterus and fallopian tubes were surgically absent.    Due to ongoing and recurrent right-sided pelvic pain and the strong possibility of a intermittent torsion we determined that the right ovary was to be removed.    The course the right ureter was clearly identified.  This was well clear of the infundibulopelvic ligament on the right.    Using the LigaSure the infundibulopelvic ligament was crossclamped cauterized and transected.   8 mm port site was removed and a 10 mm port was placed.  Through this was passed a Endo Catch bag.  The ovary was placed in the Endo Catch bag and removed through the 10 mm port site without difficulty.  Once this was completed the port site was closed with the fascial closure system using 0 Vicryl suture.    Dissection of the sigmoid off of the left pelvic sidewall was performed and lysis of adhesions.  The left ovary was eventually discovered on the lateral aspect of the sigmoid colon-densely adherent to the colon.  As she has  not had symptoms  involving the right ovary, and the anticipated work that would be involved removing with left ovary, we determined to leave that in situ.  At this point seizure was complete.  Hemostasis was assured.  The pneumoperitoneum was reduced instruments removed and incisions were closed by Mahad Elaine PA-C using 4-0 Vicryl and Dermabond.  The vaginal stick sponge was removed.  Patient was awakened and taken to PACU in good condition.    Guilherme Juan MD

## 2022-06-06 NOTE — ANESTHESIA POSTPROCEDURE EVALUATION
Patient: Rin Garcia    Procedure: Procedure(s):  diagnostic Laparoscopy Right oopherectomy and lysis of adhesion       Anesthesia Type:  General    Note:  Disposition: Outpatient   Postop Pain Control: Uneventful            Sign Out: Well controlled pain   PONV: No   Neuro/Psych: Uneventful            Sign Out: Acceptable/Baseline neuro status   Airway/Respiratory: Uneventful            Sign Out: AIRWAY IN SITU/Resp. Support   CV/Hemodynamics: Uneventful            Sign Out: Acceptable CV status; No obvious hypovolemia; No obvious fluid overload   Other NRE: NONE   DID A NON-ROUTINE EVENT OCCUR? No           Last vitals:  Vitals Value Taken Time   /88 06/06/22 1345   Temp 36.8  C (98.2  F) 06/06/22 1253   Pulse 73 06/06/22 1345   Resp 12 06/06/22 1345   SpO2 97 % 06/06/22 1345       Electronically Signed By: JONATHAN Vega CRNA  June 6, 2022  2:23 PM

## 2022-06-06 NOTE — OR NURSING
Pt ready for surgery. Sl right sided abd pain. Preop meds given as per order and pt states needs the sticker behind ear to help with nausea. Fiance at bedside. Pt up to void preop.

## 2022-06-06 NOTE — ANESTHESIA PROCEDURE NOTES
Airway       Patient location during procedure: OR       Procedure Start/Stop Times: 6/6/2022 11:36 AM  Staff -        CRNA: Yoly Meza APRN CRNA       Performed By: CRNA  Consent for Airway        Urgency: elective  Indications and Patient Condition       Indications for airway management: liss-procedural       Induction type:intravenous       Mask difficulty assessment: 1 - vent by mask    Final Airway Details       Final airway type: endotracheal airway       Successful airway: ETT - single and Oral  Endotracheal Airway Details        ETT size (mm): 7.0       Cuffed: yes       Cuff volume (mL): 6       Successful intubation technique: video laryngoscopy       VL Blade Size: Little 3       Grade View of Cords: 1       Position: Right       Measured from: gums/teeth       Secured at (cm): 21       Bite block used: None    Post intubation assessment        Placement verified by: capnometry, equal breath sounds and chest rise        Number of attempts at approach: 1       Number of other approaches attempted: 0       Secured with: silk tape       Ease of procedure: easy       Dentition: Intact and Unchanged    Medication(s) Administered   Medication Administration Time: 6/6/2022 11:36 AM

## 2022-06-09 LAB
PATH REPORT.COMMENTS IMP SPEC: NORMAL
PATH REPORT.COMMENTS IMP SPEC: NORMAL
PATH REPORT.FINAL DX SPEC: NORMAL
PATH REPORT.GROSS SPEC: NORMAL
PATH REPORT.MICROSCOPIC SPEC OTHER STN: NORMAL
PATH REPORT.RELEVANT HX SPEC: NORMAL
PHOTO IMAGE: NORMAL

## 2022-06-10 NOTE — RESULT ENCOUNTER NOTE
Here is the report of your Right ovary.  I am glad that you are feeling better  Guilherme Juan MD

## 2022-07-19 ENCOUNTER — OFFICE VISIT (OUTPATIENT)
Dept: OBGYN | Facility: CLINIC | Age: 47
End: 2022-07-19
Payer: COMMERCIAL

## 2022-07-19 VITALS
HEART RATE: 76 BPM | HEIGHT: 68 IN | TEMPERATURE: 98.8 F | WEIGHT: 198.8 LBS | RESPIRATION RATE: 16 BRPM | BODY MASS INDEX: 30.13 KG/M2 | DIASTOLIC BLOOD PRESSURE: 92 MMHG | SYSTOLIC BLOOD PRESSURE: 141 MMHG

## 2022-07-19 DIAGNOSIS — Z98.890 POSTOPERATIVE STATE: ICD-10-CM

## 2022-07-19 DIAGNOSIS — R10.2 PELVIC PAIN IN FEMALE: Primary | ICD-10-CM

## 2022-07-19 PROCEDURE — 99212 OFFICE O/P EST SF 10 MIN: CPT | Performed by: OBSTETRICS & GYNECOLOGY

## 2022-07-19 NOTE — PROGRESS NOTES
"JACQUELINE Garcia is a 47 year old female presents for post operative check. She is  6  week(s) status post Laparoscopic Right oophorectomy.  She reports doing extremely well and denies significant pain or bleeding. Pathological findings significant for benign cyst.  Her left ovary is     O.  Blood pressure (!) 141/92, pulse 76, temperature 98.8  F (37.1  C), resp. rate 16, height 1.727 m (5' 8\"), weight 90.2 kg (198 lb 12.8 oz), not currently breastfeeding.    Abd: soft, non-tender, non-distended. Incision clear, dry, and intact without evidence of infection.    A. /P.  (R10.2) Pelvic pain in female  (primary encounter diagnosis)  Comment: s/p Right oophorectomy  Plan: resume normal activities    (Z98.890) Postoperative state    Note: if a Left oophorectomy is anticipated, a left ureteral stent should be placed and anticipation of removing the colon from covering the ovary -Densly adherent       Follow up prn or when due for next annual exam.    Guilherme Juan MD    "

## 2022-11-19 ENCOUNTER — HEALTH MAINTENANCE LETTER (OUTPATIENT)
Age: 47
End: 2022-11-19

## 2022-12-19 ENCOUNTER — TRANSFERRED RECORDS (OUTPATIENT)
Dept: HEALTH INFORMATION MANAGEMENT | Facility: CLINIC | Age: 47
End: 2022-12-19

## 2023-01-25 ENCOUNTER — TELEPHONE (OUTPATIENT)
Dept: OBGYN | Facility: CLINIC | Age: 48
End: 2023-01-25
Payer: COMMERCIAL

## 2023-01-25 NOTE — TELEPHONE ENCOUNTER
Panel Management Review      Health Maintenance List    Health Maintenance   Topic Date Due     ADVANCE CARE PLANNING  Never done     HEPATITIS B IMMUNIZATION (1 of 3 - 3-dose series) Never done     COVID-19 Vaccine (1) Never done     COLORECTAL CANCER SCREENING  Never done     HEPATITIS C SCREENING  Never done     YEARLY PREVENTIVE VISIT  08/14/2010     LIPID  Never done     INFLUENZA VACCINE (1) Never done     PHQ-2 (once per calendar year)  01/01/2023     MAMMO SCREENING  04/13/2023     DTAP/TDAP/TD IMMUNIZATION (4 - Td or Tdap) 05/16/2027     HIV SCREENING  Completed     Pneumococcal Vaccine: Pediatrics (0 to 5 Years) and At-Risk Patients (6 to 64 Years)  Aged Out     IPV IMMUNIZATION  Aged Out     MENINGITIS IMMUNIZATION  Aged Out     PAP  Discontinued       Composite cancer screening  Chart review shows that this patient is due/due soon for the following Colonoscopy  Lab Results   Component Value Date    PAP NIL 09/19/2012     Past Surgical History:   Procedure Laterality Date     APPENDECTOMY       BIOPSY BREAST       COLONOSCOPY  11/2019    Family history, on 3yr plan     GYN SURGERY      hysterectomy     HYSTERECTOMY, PAP NO LONGER INDICATED       LAPAROSCOPIC OOPHORECTOMY Right 6/6/2022    Procedure: Right oophorectomy;  Surgeon: Guilherme Juan MD;  Location: [facility] OR     LAPAROSCOPY DIAGNOSTIC (GYN) Bilateral 6/6/2022    Procedure: diagnostic Laparoscopy and lysis of adhesion;  Surgeon: Guilherme Juan MD;  Location: [facility] OR     PELVIS LAPAROSCOPY,DX      2003,04,06       Is hysterectomy listed in surgical history? Yes   Is mastectomy listed in surgical history? No     Summary:    Patient is due/failing the following:   Colonoscopy    Action needed: Patient needs office visit for colon screening.    Type of outreach:  Sent [place] message.      Staff Signature:  GALILEA HWANG MA

## 2023-01-25 NOTE — LETTER
2023      Rin Garcia  [address] [facility] [address]    To ensure we are providing the best quality care, we have reviewed your chart and see that you are due for:    Colon Cancer Screening:    If you have received a referral to schedule a Colonoscopy or choose to do a Colonoscopy instead of the FIT/ Cologuard test, please call [phone] to schedule.    If you have received a FIT test kit from a prior office visit, please check the expiration date. If , please get a new kit from the Lab/ Clinic. If not , please complete the test and mail in as soon as you are able.    If you would prefer to complete a Cologuard test, please reach out to your provider to see if this is an option for you.    You may call Dept: [phone] if you have any questions. If you have completed the tests outside of [facility], please have the results forwarded to our office Fax # [phone]. We will update the chart for your primary Physician to review before your next annual physical.              Dear Rin,          Sincerely,      Guilherme Juan MD

## 2023-04-09 ENCOUNTER — HEALTH MAINTENANCE LETTER (OUTPATIENT)
Age: 48
End: 2023-04-09

## 2023-04-14 ENCOUNTER — OFFICE VISIT (OUTPATIENT)
Dept: FAMILY MEDICINE | Facility: CLINIC | Age: 48
End: 2023-04-14
Payer: COMMERCIAL

## 2023-04-14 ENCOUNTER — HOSPITAL ENCOUNTER (OUTPATIENT)
Dept: MAMMOGRAPHY | Facility: CLINIC | Age: 48
Discharge: HOME | End: 2023-04-14
Payer: COMMERCIAL

## 2023-04-14 VITALS
HEART RATE: 87 BPM | OXYGEN SATURATION: 99 % | WEIGHT: 199 LBS | DIASTOLIC BLOOD PRESSURE: 78 MMHG | TEMPERATURE: 98.5 F | BODY MASS INDEX: 30.16 KG/M2 | HEIGHT: 68 IN | SYSTOLIC BLOOD PRESSURE: 138 MMHG

## 2023-04-14 DIAGNOSIS — Z13.29 SCREENING FOR HYPOTHYROIDISM: ICD-10-CM

## 2023-04-14 DIAGNOSIS — I10 BENIGN ESSENTIAL HYPERTENSION: ICD-10-CM

## 2023-04-14 DIAGNOSIS — Z13.1 SCREENING FOR DIABETES MELLITUS: ICD-10-CM

## 2023-04-14 DIAGNOSIS — E78.2 MIXED HYPERLIPIDEMIA: ICD-10-CM

## 2023-04-14 DIAGNOSIS — Z11.59 NEED FOR HEPATITIS C SCREENING TEST: ICD-10-CM

## 2023-04-14 DIAGNOSIS — Z00.00 ROUTINE GENERAL MEDICAL EXAMINATION AT A HEALTH CARE FACILITY: Primary | ICD-10-CM

## 2023-04-14 DIAGNOSIS — Z12.31 VISIT FOR SCREENING MAMMOGRAM: ICD-10-CM

## 2023-04-14 LAB
ANION GAP SERPL CALCULATED.3IONS-SCNC: 11 MMOL/L (ref 7–15)
BUN SERPL-MCNC: 14.2 MG/DL (ref 6–20)
CALCIUM SERPL-MCNC: 9.5 MG/DL (ref 8.6–10)
CHLORIDE SERPL-SCNC: 102 MMOL/L (ref 98–107)
CHOLEST SERPL-MCNC: 243 MG/DL
CREAT SERPL-MCNC: 0.88 MG/DL (ref 0.51–0.95)
DEPRECATED HCO3 PLAS-SCNC: 24 MMOL/L (ref 22–29)
ERYTHROCYTE [DISTWIDTH] IN BLOOD BY AUTOMATED COUNT: 12.1 % (ref 10–15)
GFR SERPL CREATININE-BSD FRML MDRD: 81 ML/MIN/1.73M2
GLUCOSE SERPL-MCNC: 100 MG/DL (ref 70–99)
HBA1C MFR BLD: 5.5 % (ref 0–5.6)
HCT VFR BLD AUTO: 42.5 % (ref 35–47)
HDLC SERPL-MCNC: 59 MG/DL
HGB BLD-MCNC: 14.3 G/DL (ref 11.7–15.7)
LDLC SERPL CALC-MCNC: 156 MG/DL
MCH RBC QN AUTO: 31 PG (ref 26.5–33)
MCHC RBC AUTO-ENTMCNC: 33.6 G/DL (ref 31.5–36.5)
MCV RBC AUTO: 92 FL (ref 78–100)
NONHDLC SERPL-MCNC: 184 MG/DL
PLATELET # BLD AUTO: 262 10E3/UL (ref 150–450)
POTASSIUM SERPL-SCNC: 4.2 MMOL/L (ref 3.4–5.3)
RBC # BLD AUTO: 4.61 10E6/UL (ref 3.8–5.2)
SODIUM SERPL-SCNC: 137 MMOL/L (ref 136–145)
TRIGL SERPL-MCNC: 142 MG/DL
TSH SERPL DL<=0.005 MIU/L-ACNC: 0.85 UIU/ML (ref 0.3–4.2)
WBC # BLD AUTO: 9.5 10E3/UL (ref 4–11)

## 2023-04-14 PROCEDURE — 77067 SCR MAMMO BI INCL CAD: CPT

## 2023-04-14 PROCEDURE — 85027 COMPLETE CBC AUTOMATED: CPT | Performed by: NURSE PRACTITIONER

## 2023-04-14 PROCEDURE — 99396 PREV VISIT EST AGE 40-64: CPT | Performed by: NURSE PRACTITIONER

## 2023-04-14 PROCEDURE — 80048 BASIC METABOLIC PNL TOTAL CA: CPT | Performed by: NURSE PRACTITIONER

## 2023-04-14 PROCEDURE — 84443 ASSAY THYROID STIM HORMONE: CPT | Performed by: NURSE PRACTITIONER

## 2023-04-14 PROCEDURE — 83036 HEMOGLOBIN GLYCOSYLATED A1C: CPT | Performed by: NURSE PRACTITIONER

## 2023-04-14 PROCEDURE — 80061 LIPID PANEL: CPT | Performed by: NURSE PRACTITIONER

## 2023-04-14 PROCEDURE — 36415 COLL VENOUS BLD VENIPUNCTURE: CPT | Performed by: NURSE PRACTITIONER

## 2023-04-14 RX ORDER — LISINOPRIL 10 MG/1
10 TABLET ORAL DAILY
Qty: 90 TABLET | Refills: 1 | Status: SHIPPED | OUTPATIENT
Start: 2023-04-14 | End: 2023-09-06

## 2023-04-14 ASSESSMENT — ENCOUNTER SYMPTOMS
EYE PAIN: 0
WEAKNESS: 0
DYSURIA: 0
FREQUENCY: 0
HEMATURIA: 0
CONSTIPATION: 0
ABDOMINAL PAIN: 0
HEADACHES: 0
NERVOUS/ANXIOUS: 0
SORE THROAT: 0
NAUSEA: 0
FEVER: 0
BREAST MASS: 0
COUGH: 0
JOINT SWELLING: 0
HEARTBURN: 0
SHORTNESS OF BREATH: 0
ARTHRALGIAS: 0
PARESTHESIAS: 0
CHILLS: 0
PALPITATIONS: 0
HEMATOCHEZIA: 0
MYALGIAS: 0
DIZZINESS: 0
DIARRHEA: 0

## 2023-04-14 NOTE — PROGRESS NOTES
SUBJECTIVE:   CC: Rin is an 48 year old who presents for preventive health visit.       4/14/2023    10:37 AM   Additional Questions   Roomed by [facility]   Patient has been advised of split billing requirements and indicates understanding: Yes  Healthy Habits:     Getting at least 3 servings of Calcium per day:  Yes    Bi-annual eye exam:  NO    Dental care twice a year:  NO    Sleep apnea or symptoms of sleep apnea:  None    Diet:  Other    Frequency of exercise:  2-3 days/week    Duration of exercise:  Less than 15 minutes    Taking medications regularly:  Not Applicable    Medication side effects:  Not applicable    PHQ-2 Total Score: 0    Additional concerns today:  Yes          Today's PHQ-2 Score:       4/14/2023    10:22 AM   PHQ-2 ( 1999 [employer])   Q1: Little interest or pleasure in doing things 0   Q2: Feeling down, depressed or hopeless 0   PHQ-2 Score 0   Q1: Little interest or pleasure in doing things Not at all    Not at all   Q2: Feeling down, depressed or hopeless Not at all    Not at all   PHQ-2 Score 0    0       Have you ever done Advance Care Planning? (For example, a Health Directive, POLST, or a discussion with a medical provider or your loved ones about your wishes):     Social History     Tobacco Use     Smoking status: Never     Smokeless tobacco: Never   Vaping Use     Vaping status: Never Used     Passive vaping exposure: Yes   Substance Use Topics     Alcohol use: Yes     Comment: 1 drink per month             4/14/2023    10:22 AM   Alcohol Use   Prescreen: >3 drinks/day or >7 drinks/week? No          View : No data to display.              Reviewed orders with patient.  Reviewed health maintenance and updated orders accordingly - Yes  Labs reviewed in EPIC  BP Readings from Last 3 Encounters:   04/14/23 138/78   07/19/22 (!) 141/92   06/06/22 (!) 135/97    Wt Readings from Last 3 Encounters:   04/14/23 90.3 kg (199 lb)   07/19/22 90.2 kg (198 lb 12.8 oz)   06/06/22 88 kg (194  lb)                  Patient Active Problem List   Diagnosis     CARDIOVASCULAR SCREENING; LDL GOAL LESS THAN 160     Breast lump     Past Surgical History:   Procedure Laterality Date     APPENDECTOMY       BIOPSY BREAST       COLONOSCOPY  11/2019    Family history, on 3yr plan     GYN SURGERY      hysterectomy     HYSTERECTOMY, PAP NO LONGER INDICATED       LAPAROSCOPIC OOPHORECTOMY Right 6/6/2022    Procedure: Right oophorectomy;  Surgeon: Guilherme Juan MD;  Location: [facility]     LAPAROSCOPY DIAGNOSTIC (GYN) Bilateral 6/6/2022    Procedure: diagnostic Laparoscopy and lysis of adhesion;  Surgeon: Guilherme Juan MD;  Location: [facility]     PELVIS LAPAROSCOPY,DX      2003,04,06       Social History     Tobacco Use     Smoking status: Never     Smokeless tobacco: Never   Vaping Use     Vaping status: Never Used     Passive vaping exposure: Yes   Substance Use Topics     Alcohol use: Yes     Comment: 1 drink per month     Family History   Problem Relation Age of Onset     Gynecology Mother         hysterectomy- pre ca     Hypertension Mother      Cancer Father         colon, prostate and kidney     Colon Cancer Father      Prostate Cancer Father      Dementia Maternal Grandmother      Osteoporosis Maternal Grandmother      Cancer Maternal Grandfather      Colon Cancer Maternal Grandfather      Cancer Paternal Grandfather      Colon Cancer Paternal Grandfather      Colon Polyps Sister      Hirschsprung's Disease Nephew      Breast Cancer Other      Cancer Other         stomach/esophogus     Other Cancer Other         Stomach     Myocardial Infarction Paternal Great-Grandfather      Myocardial Infarction Paternal Great-Grandmother      Cancer Maternal Great-Grandfather         Lung and Colon     Hypertension Maternal Great-Grandfather          Current Outpatient Medications   Medication Sig Dispense Refill     lisinopril (ZESTRIL) 10 MG tablet Take 1 tablet (10 mg) by mouth daily 90 tablet 1     MULTIVITAMIN  TABS   OR 1 TABLET BY MOUTH DAILY       psyllium (METAMUCIL/KONSYL) 58.6 % powder Take by mouth daily       Allergies   Allergen Reactions     Augmentin Nausea and Vomiting and Diarrhea     Vicodin [Hydrocodone-Acetaminophen] Itching     Recent Labs   Lab Test 04/14/23  1141   A1C 5.5        Breast Cancer Screening:    FHS-7:       4/13/2022     7:59 AM 5/31/2022    11:43 AM 4/14/2023    10:22 AM   Breast CA Risk Assessment (FHS-7)   Did any of your first-degree relatives have breast or ovarian cancer? No No Yes   Did any of your relatives have bilateral breast cancer? No No Unknown   Did any man in your family have breast cancer? No No    Did any woman in your family have breast and ovarian cancer? No Yes Yes   Did any woman in your family have breast cancer before age 50 y? No Unknown Unknown   Do you have 2 or more relatives with breast and/or ovarian cancer? Yes Yes Yes   Do you have 2 or more relatives with breast and/or bowel cancer? Yes Yes Yes       Mammogram Screening: Recommended annual mammography  Pertinent mammograms are reviewed under the imaging tab.    History of abnormal Pap smear: NO - age 30-65 PAP every 5 years with negative HPV co-testing recommended      9/19/2012     4:23 PM 8/14/2009    12:00 AM   PAP / HPV   PAP (Historical) NIL   NIL       Reviewed and updated as needed this visit by clinical staff                  Reviewed and updated as needed this visit by Provider                 Past Medical History:   Diagnosis Date     Abnormal Pap smear of cervix remote past    ?details?     Endometriosis      Endometriosis 08/18/2009     Hypertension 10yrs    Prehypertension. not on meds     PONV (postoperative nausea and vomiting)       Past Surgical History:   Procedure Laterality Date     APPENDECTOMY       BIOPSY BREAST       COLONOSCOPY  11/2019    Family history, on 3yr plan     GYN SURGERY      hysterectomy     HYSTERECTOMY, PAP NO LONGER INDICATED       LAPAROSCOPIC OOPHORECTOMY Right  "2022    Procedure: Right oophorectomy;  Surgeon: Guilherme Juan MD;  Location: [facility]     LAPAROSCOPY DIAGNOSTIC (GYN) Bilateral 2022    Procedure: diagnostic Laparoscopy and lysis of adhesion;  Surgeon: Guilherme Juan MD;  Location: [facility]     PELVIS LAPAROSCOPY,DX      ,,     OB History    Para Term  AB Living   1 1 1 0 0 1   SAB IAB Ectopic Multiple Live Births   0 0 0 0 0      # Outcome Date GA Lbr Poncho/2nd Weight Sex Delivery Anes PTL Lv   1 Term               Name: Santhosh       Review of Systems   Constitutional: Negative for chills and fever.   HENT: Negative for congestion, ear pain, hearing loss and sore throat.    Eyes: Negative for pain and visual disturbance.   Respiratory: Negative for cough and shortness of breath.    Cardiovascular: Negative for chest pain, palpitations and peripheral edema.   Gastrointestinal: Negative for abdominal pain, constipation, diarrhea, heartburn, hematochezia and nausea.   Breasts:  Negative for tenderness, breast mass and discharge.   Genitourinary: Negative for dysuria, frequency, genital sores, hematuria, pelvic pain, urgency, vaginal bleeding and vaginal discharge.   Musculoskeletal: Negative for arthralgias, joint swelling and myalgias.   Skin: Negative for rash.   Neurological: Negative for dizziness, weakness, headaches and paresthesias.   Psychiatric/Behavioral: Negative for mood changes. The patient is not nervous/anxious.           OBJECTIVE:   LMP  (LMP Unknown)  /78   Pulse 87   Temp 98.5  F (36.9  C) (Tympanic)   Ht 1.715 m (5' 7.5\")   Wt 90.3 kg (199 lb)   LMP  (LMP Unknown)   SpO2 99%   BMI 30.71 kg/m      Physical Exam  GENERAL APPEARANCE: healthy, alert and no distress  EYES: Eyes grossly normal to inspection, PERRL and conjunctivae and sclerae normal  HENT: ear canals and TM's normal, nose and mouth without ulcers or lesions, oropharynx clear and oral mucous membranes moist  NECK: no adenopathy, no " asymmetry, masses, or scars and thyroid normal to palpation  RESP: lungs clear to auscultation - no rales, rhonchi or wheezes  BREAST: normal without masses, tenderness or nipple discharge and no palpable axillary masses or adenopathy  CV: regular rate and rhythm, normal S1 S2, no S3 or S4, no murmur, click or rub, no peripheral edema and peripheral pulses strong  ABDOMEN: soft, nontender, no hepatosplenomegaly, no masses and bowel sounds normal  MS: no musculoskeletal defects are noted and gait is age appropriate without ataxia  SKIN: no suspicious lesions or rashes  NEURO: Normal strength and tone, sensory exam grossly normal, mentation intact and speech normal  PSYCH: mentation appears normal and affect normal/bright    Diagnostic Test Results:  Labs reviewed in Epic  Results for orders placed or performed in visit on 04/14/23 (from the past 24 hour(s))   Hemoglobin A1c   Result Value Ref Range    Hemoglobin A1C 5.5 0.0 - 5.6 %   CBC with platelets   Result Value Ref Range    WBC Count 9.5 4.0 - 11.0 10e3/uL    RBC Count 4.61 3.80 - 5.20 10e6/uL    Hemoglobin 14.3 11.7 - 15.7 g/dL    Hematocrit 42.5 35.0 - 47.0 %    MCV 92 78 - 100 fL    MCH 31.0 26.5 - 33.0 pg    MCHC 33.6 31.5 - 36.5 g/dL    RDW 12.1 10.0 - 15.0 %    Platelet Count 262 150 - 450 10e3/uL       ASSESSMENT/PLAN:   (Z00.00) Routine general medical examination at a health care facility  (primary encounter diagnosis)  Comment:   Plan: Basic metabolic panel  (Ca, Cl, CO2, Creat,         Gluc, K, Na, BUN), CBC with platelets, TSH with        free T4 reflex, Hemoglobin A1c            (Z11.59) Need for hepatitis C screening test  Comment: declined   Plan:     (E78.2) Mixed hyperlipidemia  Comment:   Plan: Lipid panel reflex to direct LDL Non-fasting            (Z13.1) Screening for diabetes mellitus  Comment:   Plan: Hemoglobin A1c            (Z13.29) Screening for hypothyroidism  Comment: labs pending   Plan: TSH with free T4 reflex            (I10)  "Benign essential hypertension  Comment: uncontrolled will start lisinopril   Plan: lisinopril (ZESTRIL) 10 MG tablet        Patient has been advised of split billing requirements and indicates understanding: Yes      COUNSELING:  Reviewed preventive health counseling, as reflected in patient instructions       Regular exercise       Healthy diet/nutrition       Vision screening       Hearing screening       MMR vaccine reminder (1 dose) for patients born after [birth year] with no documented vaccination/immunity        Aspirin prophylaxis       Alcohol Use       Family planning       Folic Acid       Osteoporosis prevention/bone health       Colorectal Cancer Screening       Consider Hep C screening for all patients one time for ages 18-79 years       HIV screeninx in teen years, 1x in adult years, and at intervals if high risk       (Rowan)menopause management      BMI:   Estimated body mass index is 30.23 kg/m  as calculated from the following:    Height as of 22: 1.727 m (5' 8\").    Weight as of 22: 90.2 kg (198 lb 12.8 oz).   Weight management plan: Discussed healthy diet and exercise guidelines      She reports that she has never smoked. She has never used smokeless tobacco.          JONATHAN Bartholomew CNP  [facility]  "

## 2023-04-14 NOTE — PATIENT INSTRUCTIONS
Preventive Health Recommendations  Female Ages 40 to 49    Yearly exam:     See your health care provider every year in order to  1. Review health changes.   2. Discuss preventive care.    3. Review your medicines if your doctor prescribed any.      Get a Pap test every three years (unless you have an abnormal result and your provider advises testing more often).      If you get Pap tests with HPV test, you only need to test every 5 years, unless you have an abnormal result. You do not need a Pap test if your uterus was removed (hysterectomy) and you have not had cancer.      You should be tested each year for STDs (sexually transmitted diseases), if you're at risk.     Ask your doctor if you should have a mammogram.      Have a colonoscopy (test for colon cancer) if someone in your family has had colon cancer or polyps before age 50.       Have a cholesterol test every 5 years.       Have a diabetes test (fasting glucose) after age 45. If you are at risk for diabetes, you should have this test every 3 years.    Shots: Get a flu shot each year. Get a tetanus shot every 10 years.     Nutrition:     Eat at least 5 servings of fruits and vegetables each day.    Eat whole-grain bread, whole-wheat pasta and brown rice instead of white grains and rice.    Get adequate Calcium and Vitamin D.      Lifestyle    Exercise at least 150 minutes a week (an average of 30 minutes a day, 5 days a week). This will help you control your weight and prevent disease.    Limit alcohol to one drink per day.    No smoking.     Wear sunscreen to prevent skin cancer.    See your dentist every six months for an exam and cleaning.  
negative - no cough

## 2023-05-08 ENCOUNTER — OFFICE VISIT (OUTPATIENT)
Dept: OBGYN | Facility: CLINIC | Age: 48
End: 2023-05-08
Payer: COMMERCIAL

## 2023-05-08 VITALS
WEIGHT: 197 LBS | BODY MASS INDEX: 29.86 KG/M2 | DIASTOLIC BLOOD PRESSURE: 74 MMHG | HEIGHT: 68 IN | RESPIRATION RATE: 16 BRPM | TEMPERATURE: 98.1 F | SYSTOLIC BLOOD PRESSURE: 133 MMHG | HEART RATE: 88 BPM

## 2023-05-08 DIAGNOSIS — Z90.721 S/P RIGHT OOPHORECTOMY: ICD-10-CM

## 2023-05-08 DIAGNOSIS — N80.9 ENDOMETRIOSIS: Primary | ICD-10-CM

## 2023-05-08 PROCEDURE — 99214 OFFICE O/P EST MOD 30 MIN: CPT | Performed by: OBSTETRICS & GYNECOLOGY

## 2023-05-08 NOTE — PROGRESS NOTES
[facility]    Gynecology Office Note    CC:   Chief Complaint   Patient presents with     Consult        HPI: Rin Garcia is a 48 year old  who presents for establishing care. Patient has a significant past GYN history and looking to transfer and establish care. History is significant for endometriosis, history of hysterectomy with BS, history of right oophorectomy for pelvic pain. She has significant past family history of GYN concerns, most women have had hysterectomies and surgeries at young ages. Also strong family history for cancer (colon, stomach, lung, breast). Her sister has undergone genetic counseling with no identifiable cancer syndromes.      GYN Hx:     Patient is menopausal: not suspected, does report some night sweats occasionally but no hot flashes  S/p Hysterectomy  History of endometriosis. Was on Lupron for a few months in her 20s with bad side effects. Then maintained on OCPs for >10 years.   History of right oophorectomy (2022) due to persistent right sided pelvic pain.     No LMP recorded (lmp unknown). Patient has had a hysterectomy.    Last Pap Smear:   Lab Results   Component Value Date    PAP NIL 2012   S/p hysterectomy, no need for continued pap smear testing.    ROS: A 10 pt ROS was completed and found to be otherwise negative unless mentioned in the HPI.     PMH:   Past Medical History:   Diagnosis Date     Abnormal Pap smear of cervix remote past    ?details?     Endometriosis      Endometriosis 2009     Hypertension 10yrs    Prehypertension. not on meds     PONV (postoperative nausea and vomiting)        PSHx:   Past Surgical History:   Procedure Laterality Date     APPENDECTOMY       BIOPSY BREAST       COLONOSCOPY  2019    Family history, on 3yr plan     GYN SURGERY      hysterectomy     HYSTERECTOMY, PAP NO LONGER INDICATED       LAPAROSCOPIC OOPHORECTOMY Right 2022    Procedure: Right oophorectomy;  Surgeon: Drake  Guilherme Dao MD;  Location: [facility]     LAPAROSCOPY DIAGNOSTIC (GYN) Bilateral 2022    Procedure: diagnostic Laparoscopy and lysis of adhesion;  Surgeon: Guilherme Juan MD;  Location: [facility]     PELVIS LAPAROSCOPY,DX             OBHx:   OB History    Para Term  AB Living   1 1 1 0 0 1   SAB IAB Ectopic Multiple Live Births   0 0 0 0 0      # Outcome Date GA Lbr Poncho/2nd Weight Sex Delivery Anes PTL Lv   1 Term               Name: Santhosh       Medications:   lisinopril (ZESTRIL) 10 MG tablet, Take 1 tablet (10 mg) by mouth daily  MULTIVITAMIN TABS   OR, 1 TABLET BY MOUTH DAILY  psyllium (METAMUCIL/KONSYL) 58.6 % powder, Take by mouth daily    No current facility-administered medications on file prior to visit.      Allergies:      Allergies   Allergen Reactions     Amoxicillin-Pot Clavulanate Nausea and Vomiting and Diarrhea     Vicodin [Hydrocodone-Acetaminophen] Itching       Social History:   Social History     Socioeconomic History     Marital status: Single     Spouse name: Not on file     Number of children: 1     Years of education: Not on file     Highest education level: Not on file   Occupational History     Employer: UNEMPLOYED   Tobacco Use     Smoking status: Never     Smokeless tobacco: Never   Vaping Use     Vaping status: Never Used     Passive vaping exposure: Yes   Substance and Sexual Activity     Alcohol use: Yes     Comment: 1 drink per month     Drug use: No     Sexual activity: Yes     Partners: Male     Birth control/protection: Female Surgical     Comment: hyst   Other Topics Concern     Parent/sibling w/ CABG, MI or angioplasty before 65F 55M? No   Social History Narrative     Not on file     Social Determinants of Health     Financial Resource Strain: Not on file   Food Insecurity: Not on file   Transportation Needs: Not on file   Physical Activity: Not on file   Stress: Not on file   Social Connections: Not on file   Intimate Partner Violence: Not on file  "  Housing Stability: Not on file         Family History:   Family History   Problem Relation Age of Onset     Gynecology Mother         hysterectomy- pre ca     Hypertension Mother      Cancer Father         colon, prostate and kidney     Colon Cancer Father      Prostate Cancer Father      Dementia Maternal Grandmother      Osteoporosis Maternal Grandmother      Cancer Maternal Grandfather      Colon Cancer Maternal Grandfather      Cancer Paternal Grandfather      Colon Cancer Paternal Grandfather      Colon Polyps Sister      Hirschsprung's Disease Nephew      Breast Cancer Other      Cancer Other         stomach/esophogus     Other Cancer Other         Stomach     Myocardial Infarction Paternal Great-Grandfather      Myocardial Infarction Paternal Great-Grandmother      Cancer Maternal Great-Grandfather         Lung and Colon     Hypertension Maternal Great-Grandfather        Physical Exam:   Vitals:    05/08/23 0906   BP: 133/74   BP Location: Left arm   Patient Position: Chair   Cuff Size: Adult Regular   Pulse: 88   Resp: 16   Temp: 98.1  F (36.7  C)   TempSrc: Tympanic   Weight: 89.4 kg (197 lb)   Height: 1.721 m (5' 7.75\")      Estimated body mass index is 30.18 kg/m  as calculated from the following:    Height as of this encounter: 1.721 m (5' 7.75\").    Weight as of this encounter: 89.4 kg (197 lb).    General appearance: well-hydrated, A&O x 3, no apparent distress  Lungs: Equal expansion bilaterally, no accessory muscle use  Heart: No heaves or thrills.   Constitutional: See vitals  Neuro: CN II-XII grossly intact      Records review: Reviewed prior operative reports. History of right oophorectomy for pelvic pain. Left ovary completely adherent to the lateral side wall, covered by colon. Note in records that would recommend ureteral stent if need for left oophorectomy.  Also has a history of endometriosis. S/p hysterectomy and bilateral salpingectomy.     Assessment and Plan:     Encounter Diagnoses "   Name Primary?     Endometriosis Yes     S/P right oophorectomy      Patient is establishing care. History of endometriosis, s/p hysterectomy, subsequent right oophorectomy for pelvic pain. Surgical findings at time of right oophorectomy found the left ovary to have dense adhesions with the colon. Patient is concerned that she might need to have this out eventually due to family history of many women having GYN issues as well as her own history. She is concerned she could eventually get ovarian cancer. Discussed risk factors, family history contribution (no one with known ovarian cancer but many women with early surgery for removal). Discussed risk reduction based on her history of OCP use. Will plan for symptom monitoring at this point, she is asymptomatic at the time. Discussed no reliable form of ovarian cancer screening aside from symptom monitoring and exams. Will plan for annual pelvic exams for monitoring.     Health maintenance: mammogram up to date  Return to clinic as needed and for annual exams.    Cristal Allen DO    35 minutes spent by me on the date of the encounter doing chart review, review of outside records, patient visit and documentation.

## 2023-05-08 NOTE — NURSING NOTE
"Initial /74 (BP Location: Left arm, Patient Position: Chair, Cuff Size: Adult Regular)   Pulse 88   Temp 98.1  F (36.7  C) (Tympanic)   Resp 16   Ht 1.721 m (5' 7.75\")   Wt 89.4 kg (197 lb)   LMP  (LMP Unknown)   BMI 30.18 kg/m   Estimated body mass index is 30.18 kg/m  as calculated from the following:    Height as of this encounter: 1.721 m (5' 7.75\").    Weight as of this encounter: 89.4 kg (197 lb). .      "

## 2023-09-06 DIAGNOSIS — I10 BENIGN ESSENTIAL HYPERTENSION: ICD-10-CM

## 2023-09-06 RX ORDER — LISINOPRIL 10 MG/1
10 TABLET ORAL DAILY
Qty: 90 TABLET | Refills: 1 | Status: SHIPPED | OUTPATIENT
Start: 2023-09-06 | End: 2023-11-10

## 2023-11-10 ENCOUNTER — OFFICE VISIT (OUTPATIENT)
Dept: FAMILY MEDICINE | Facility: CLINIC | Age: 48
End: 2023-11-10
Payer: COMMERCIAL

## 2023-11-10 VITALS
DIASTOLIC BLOOD PRESSURE: 80 MMHG | WEIGHT: 193 LBS | BODY MASS INDEX: 29.25 KG/M2 | SYSTOLIC BLOOD PRESSURE: 130 MMHG | HEIGHT: 68 IN | HEART RATE: 81 BPM | OXYGEN SATURATION: 98 % | TEMPERATURE: 98.3 F

## 2023-11-10 DIAGNOSIS — L98.9 SKIN LESION: ICD-10-CM

## 2023-11-10 DIAGNOSIS — I10 BENIGN ESSENTIAL HYPERTENSION: Primary | ICD-10-CM

## 2023-11-10 PROCEDURE — 99213 OFFICE O/P EST LOW 20 MIN: CPT | Performed by: NURSE PRACTITIONER

## 2023-11-10 ASSESSMENT — PAIN SCALES - GENERAL: PAINLEVEL: NO PAIN (0)

## 2023-11-10 ASSESSMENT — ENCOUNTER SYMPTOMS: HYPERTENSION: 1

## 2023-11-10 NOTE — PROGRESS NOTES
"  Assessment & Plan     (I10) Benign essential hypertension  (primary encounter diagnosis)  Comment:  Controlled no change in treatment plan    Plan:     (L98.9) Skin lesion  Comment: Darken spot to nose if not improving patient to follow-up with dermatology   Plan: Adult Dermatology  Referral    BMI:   Estimated body mass index is 29.56 kg/m  as calculated from the following:    Height as of this encounter: 1.721 m (5' 7.76\").    Weight as of this encounter: 87.5 kg (193 lb).   Weight management plan: Discussed healthy diet and exercise guidelines    See Patient Instructions    JONATHAN Bartholomew CNP  [facility]    Cathy Gar is a 48 year old, presenting for the following health issues:  Derm Problem and Hypertension      11/10/2023    10:32 AM   Additional Questions   Roomed by Bhavani HOWELL       Hypertension     History of Present Illness       Hypertension: She presents for follow up of hypertension.  She does not check blood pressure  regularly outside of the clinic. Outside blood pressures have been over 140/90. She does not follow a low salt diet.     She eats 2-3 servings of fruits and vegetables daily.She consumes 0 sweetened beverage(s) daily.She exercises with enough effort to increase her heart rate 10 to 19 minutes per day.  She exercises with enough effort to increase her heart rate 3 or less days per week.   She is taking medications regularly.       Patient has a spot on the right side of her nose that she would like checked.           Review of Systems   Constitutional, HEENT, cardiovascular, pulmonary, gi and gu systems are negative, except as otherwise noted.      Objective    LMP  (LMP Unknown)   Body mass index is 29.56 kg/m . Vital signs:  Temp: 98.3  F (36.8  C) Temp src: Tympanic BP: 130/80 Pulse: 81     SpO2: 98 %     Height: 172.1 cm (5' 7.76\") Weight: 87.5 kg (193 lb)  Estimated body mass index is 29.56 kg/m  as calculated from the following:    " "Height as of this encounter: 1.721 m (5' 7.76\").    Weight as of this encounter: 87.5 kg (193 lb).        Physical Exam   GENERAL: healthy, alert and no distress  EYES: Eyes grossly normal to inspection, PERRL and conjunctivae and sclerae normal  HENT: ear canals and TM's normal, nose and mouth without ulcers or lesions  NECK: no adenopathy, no asymmetry, masses, or scars and thyroid normal to palpation  RESP: lungs clear to auscultation - no rales, rhonchi or wheezes  CV: regular rate and rhythm, normal S1 S2, no S3 or S4, no murmur, click or rub, no peripheral edema and peripheral pulses strong  ABDOMEN: soft, nontender, no hepatosplenomegaly, no masses and bowel sounds normal  MS: no gross musculoskeletal defects noted, no edema  SKIN: 1.5 mm darkened lesion to nose   NEURO: Normal strength and tone, mentation intact and speech normal  PSYCH: mentation appears normal, affect normal/bright                  "

## 2024-03-15 ENCOUNTER — PATIENT OUTREACH (OUTPATIENT)
Dept: CARE COORDINATION | Facility: CLINIC | Age: 49
End: 2024-03-15
Payer: COMMERCIAL

## 2024-03-29 ENCOUNTER — PATIENT OUTREACH (OUTPATIENT)
Dept: CARE COORDINATION | Facility: CLINIC | Age: 49
End: 2024-03-29
Payer: COMMERCIAL

## 2024-05-04 ENCOUNTER — HOSPITAL ENCOUNTER (OUTPATIENT)
Dept: MAMMOGRAPHY | Facility: CLINIC | Age: 49
Discharge: HOME | End: 2024-05-04
Attending: NURSE PRACTITIONER | Admitting: NURSE PRACTITIONER
Payer: COMMERCIAL

## 2024-05-04 DIAGNOSIS — Z12.31 VISIT FOR SCREENING MAMMOGRAM: ICD-10-CM

## 2024-05-04 PROCEDURE — 77063 BREAST TOMOSYNTHESIS BI: CPT

## 2024-06-16 ENCOUNTER — HEALTH MAINTENANCE LETTER (OUTPATIENT)
Age: 49
End: 2024-06-16

## 2025-03-31 SDOH — HEALTH STABILITY: PHYSICAL HEALTH: ON AVERAGE, HOW MANY DAYS PER WEEK DO YOU ENGAGE IN MODERATE TO STRENUOUS EXERCISE (LIKE A BRISK WALK)?: 3 DAYS

## 2025-03-31 SDOH — HEALTH STABILITY: PHYSICAL HEALTH: ON AVERAGE, HOW MANY MINUTES DO YOU ENGAGE IN EXERCISE AT THIS LEVEL?: 30 MIN

## 2025-03-31 ASSESSMENT — SOCIAL DETERMINANTS OF HEALTH (SDOH): HOW OFTEN DO YOU GET TOGETHER WITH FRIENDS OR RELATIVES?: ONCE A WEEK

## 2025-04-03 ENCOUNTER — ANCILLARY PROCEDURE (OUTPATIENT)
Dept: GENERAL RADIOLOGY | Facility: CLINIC | Age: 50
End: 2025-04-03
Attending: NURSE PRACTITIONER
Payer: COMMERCIAL

## 2025-04-03 ENCOUNTER — OFFICE VISIT (OUTPATIENT)
Dept: FAMILY MEDICINE | Facility: CLINIC | Age: 50
End: 2025-04-03
Payer: COMMERCIAL

## 2025-04-03 VITALS
SYSTOLIC BLOOD PRESSURE: 139 MMHG | TEMPERATURE: 98.5 F | HEIGHT: 68 IN | WEIGHT: 201 LBS | HEART RATE: 98 BPM | RESPIRATION RATE: 20 BRPM | DIASTOLIC BLOOD PRESSURE: 80 MMHG | BODY MASS INDEX: 30.46 KG/M2 | OXYGEN SATURATION: 96 %

## 2025-04-03 DIAGNOSIS — M25.512 LEFT SHOULDER PAIN, UNSPECIFIED CHRONICITY: ICD-10-CM

## 2025-04-03 DIAGNOSIS — Z00.00 ROUTINE GENERAL MEDICAL EXAMINATION AT A HEALTH CARE FACILITY: ICD-10-CM

## 2025-04-03 DIAGNOSIS — I10 BENIGN ESSENTIAL HYPERTENSION: ICD-10-CM

## 2025-04-03 DIAGNOSIS — S46.912A SHOULDER STRAIN, LEFT, INITIAL ENCOUNTER: ICD-10-CM

## 2025-04-03 LAB
ANION GAP SERPL CALCULATED.3IONS-SCNC: 11 MMOL/L (ref 7–15)
BUN SERPL-MCNC: 15.6 MG/DL (ref 6–20)
CALCIUM SERPL-MCNC: 9.9 MG/DL (ref 8.8–10.4)
CHLORIDE SERPL-SCNC: 101 MMOL/L (ref 98–107)
CHOLEST SERPL-MCNC: 270 MG/DL
CREAT SERPL-MCNC: 0.88 MG/DL (ref 0.51–0.95)
EGFRCR SERPLBLD CKD-EPI 2021: 80 ML/MIN/1.73M2
ERYTHROCYTE [DISTWIDTH] IN BLOOD BY AUTOMATED COUNT: 11.9 % (ref 10–15)
EST. AVERAGE GLUCOSE BLD GHB EST-MCNC: 114 MG/DL
FASTING STATUS PATIENT QL REPORTED: YES
FASTING STATUS PATIENT QL REPORTED: YES
GLUCOSE SERPL-MCNC: 113 MG/DL (ref 70–99)
HBA1C MFR BLD: 5.6 % (ref 0–5.6)
HCO3 SERPL-SCNC: 25 MMOL/L (ref 22–29)
HCT VFR BLD AUTO: 43.2 % (ref 35–47)
HDLC SERPL-MCNC: 50 MG/DL
HGB BLD-MCNC: 14.6 G/DL (ref 11.7–15.7)
LDLC SERPL CALC-MCNC: 191 MG/DL
MCH RBC QN AUTO: 31.1 PG (ref 26.5–33)
MCHC RBC AUTO-ENTMCNC: 33.8 G/DL (ref 31.5–36.5)
MCV RBC AUTO: 92 FL (ref 78–100)
NONHDLC SERPL-MCNC: 220 MG/DL
PLATELET # BLD AUTO: 302 10E3/UL (ref 150–450)
POTASSIUM SERPL-SCNC: 4.6 MMOL/L (ref 3.4–5.3)
RBC # BLD AUTO: 4.69 10E6/UL (ref 3.8–5.2)
SODIUM SERPL-SCNC: 137 MMOL/L (ref 135–145)
TRIGL SERPL-MCNC: 147 MG/DL
TSH SERPL DL<=0.005 MIU/L-ACNC: 0.61 UIU/ML (ref 0.3–4.2)
WBC # BLD AUTO: 9.1 10E3/UL (ref 4–11)

## 2025-04-03 RX ORDER — AMLODIPINE BESYLATE 2.5 MG/1
2.5 TABLET ORAL DAILY
Qty: 90 TABLET | Refills: 3 | Status: SHIPPED | OUTPATIENT
Start: 2025-04-03

## 2025-04-03 ASSESSMENT — PAIN SCALES - GENERAL: PAINLEVEL_OUTOF10: MILD PAIN (2)

## 2025-04-03 NOTE — PROGRESS NOTES
"Preventive Care Visit  [facility]  Siri Vidales, APRN CNP, Family Medicine  Apr 3, 2025      Assessment & Plan   Problem List Items Addressed This Visit    None  Visit Diagnoses       Routine general medical examination at a health care facility        Relevant Orders    BASIC METABOLIC PANEL    REVIEW OF HEALTH MAINTENANCE PROTOCOL ORDERS (Completed)    Lipid panel reflex to direct LDL Non-fasting    Hemoglobin A1c (Completed)    TSH with free T4 reflex    CBC with platelets (Completed)    Left shoulder pain, unspecified chronicity        Relevant Orders    Physical Therapy  Referral    OFFICE/OUTPT VISIT,EST,LEVL IV (Completed)    Shoulder strain, left, initial encounter        Relevant Orders    XR Shoulder Left G/E 3 Views    OFFICE/OUTPT VISIT,EST,LEVL IV (Completed)    Benign essential hypertension        Relevant Medications    amLODIPine (NORVASC) 2.5 MG tablet    Other Relevant Orders    OFFICE/OUTPT VISIT,EST,LEVL IV (Completed)             Patient has been advised of split billing requirements and indicates understanding: Yes       BMI  Estimated body mass index is 31.02 kg/m  as calculated from the following:    Height as of this encounter: 1.715 m (5' 7.5\").    Weight as of this encounter: 91.2 kg (201 lb).   Weight management plan: Discussed healthy diet and exercise guidelines    Counseling  Appropriate preventive services were addressed with this patient via screening, questionnaire, or discussion as appropriate for fall prevention, nutrition, physical activity, Tobacco-use cessation, social engagement, weight loss and cognition.  Checklist reviewing preventive services available has been given to the patient.  Reviewed patient's diet, addressing concerns and/or questions.   She is at risk for lack of exercise and has been provided with information to increase physical activity for the benefit of her well-being.   The patient was instructed to see the dentist " every 6 months.     See Patient Instructions      Cathy Gar is a 50 year old, presenting for the following:  Physical        4/3/2025     7:30 AM   Additional Questions   Roomed by Sarah DANIELS  Patient presents for annual physical.     Left shoulder pain x 2 month  Unknown injury - possible injury  2-3/10 constant pain, movement increases pain  Home remedies: hot/cold compression, IBU, stretching - maintaining only   No known FH of heart attack or cardia problem.  Mother has HTN since she was 29 yo.   Positive empty can test  Pain at rotator cuff  Lifting and palpate muscle increase pain radiant to left side of chest.     BP concern:   Average at home : 140/80  In the last 2 weeks - 160/90,   highest 198/102 - patient was having a stressful day and drank coffee whole day.   Patient also experiencing cold symptoms and taking cold medication.   Patient is concern because after done with the cold, she still has high BP around 160/90.   Patient had tried lisinopril 2 years ago and started develop cough so stop taking.   Patient's goal: start on BP lowering medication    No FH DM  Dad - colon cancer passed 2019 @ 67 yo. Other cancer: prostate cancer, renal cancer.   Maternal grandmother - ovarian and uterine cancer    No vaccine today        Hypertension Follow-up    Do you check your blood pressure regularly outside of the clinic? Yes   Are you following a low salt diet? No  Are your blood pressures ever more than 140 on the top number (systolic) OR more   than 90 on the bottom number (diastolic), for example 140/90? Yes    Advance Care Planning  Patient does not have a Health Care Directive: Discussed advance care planning with patient; however, patient declined at this time.      3/31/2025   General Health   How would you rate your overall physical health? Good   Feel stress (tense, anxious, or unable to sleep) Patient declined         3/31/2025   Nutrition   Three or more servings of calcium each  day? Yes   Diet: Regular (no restrictions)   How many servings of fruit and vegetables per day? (!) 2-3   How many sweetened beverages each day? 0-1         3/31/2025   Exercise   Days per week of moderate/strenous exercise 3 days   Average minutes spent exercising at this level 30 min         3/31/2025   Social Factors   Frequency of gathering with friends or relatives Once a week   Worry food won't last until get money to buy more No   Food not last or not have enough money for food? No   Do you have housing? (Housing is defined as stable permanent housing and does not include staying ouside in a car, in a tent, in an abandoned building, in an overnight shelter, or couch-surfing.) Yes   Are you worried about losing your housing? No   Lack of transportation? No   Unable to get utilities (heat,electricity)? No         3/31/2025   Dental   Dentist two times every year? (!) NO           Today's PHQ-2 Score:       4/2/2025     9:53 AM   PHQ-2 ( 1999 [employer])   Q1: Little interest or pleasure in doing things 0   Q2: Feeling down, depressed or hopeless 0   PHQ-2 Score 0    Q1: Little interest or pleasure in doing things Not at all   Q2: Feeling down, depressed or hopeless Not at all   PHQ-2 Score 0       Patient-reported           3/31/2025   Substance Use   Alcohol more than 3/day or more than 7/wk No   Do you use any other substances recreationally? No     Social History     Tobacco Use    Smoking status: Never    Smokeless tobacco: Never   Vaping Use    Vaping status: Never Used    Passive vaping exposure: Yes   Substance Use Topics    Alcohol use: Yes     Comment: One drink ocassionally, biweekly/monthly    Drug use: No           5/4/2024   LAST FHS-7 RESULTS   1st degree relative breast or ovarian cancer No   Any relative bilateral breast cancer No   Any male have breast cancer No   Any ONE woman have BOTH breast AND ovarian cancer Yes   Any woman with breast cancer before 50yrs No   2 or more relatives with breast  AND/OR ovarian cancer Yes   2 or more relatives with breast AND/OR bowel cancer No        Mammogram Screening - Mammogram every 1-2 years updated in Health Maintenance based on mutual decision making        3/31/2025   STI Screening   New sexual partner(s) since last STI/HIV test? No     History of abnormal Pap smear: No - age 30-64 HPV with reflex Pap every 5 years recommended        9/19/2012     4:23 PM 8/14/2009    12:00 AM   PAP / HPV   PAP (Historical) NIL  NIL      ASCVD Risk   The 10-year ASCVD risk score (Willa BEVERLY, et al., 2019) is: 1.6%    Values used to calculate the score:      Age: 50 years      Sex: Female      Is Non- : No      Diabetic: No      Tobacco smoker: No      Systolic Blood Pressure: 139 mmHg      Is BP treated: No      HDL Cholesterol: 59 mg/dL      Total Cholesterol: 243 mg/dL       Reviewed and updated as needed this visit by Provider                    BP Readings from Last 3 Encounters:   04/03/25 139/80   11/10/23 130/80   05/08/23 133/74    Wt Readings from Last 3 Encounters:   04/03/25 91.2 kg (201 lb)   11/10/23 87.5 kg (193 lb)   05/08/23 89.4 kg (197 lb)                  Patient Active Problem List   Diagnosis    CARDIOVASCULAR SCREENING; LDL GOAL LESS THAN 160    Breast lump     Past Surgical History:   Procedure Laterality Date    APPENDECTOMY      BIOPSY BREAST      COLONOSCOPY  11/2019    Family history, on 3yr plan    GYN SURGERY      hysterectomy    HYSTERECTOMY, PAP NO LONGER INDICATED      LAPAROSCOPIC OOPHORECTOMY Right 6/6/2022    Procedure: Right oophorectomy;  Surgeon: Guilherme Juan MD;  Location: [facility] OR    LAPAROSCOPY DIAGNOSTIC (GYN) Bilateral 6/6/2022    Procedure: diagnostic Laparoscopy and lysis of adhesion;  Surgeon: Guilherme Juan MD;  Location: [facility] OR    PELVIS LAPAROSCOPY,DX      2003,04,06       Social History     Tobacco Use    Smoking status: Never    Smokeless tobacco: Never   Substance Use Topics    Alcohol  "use: Yes     Comment: One drink ocassionally, biweekly/monthly     Family History   Problem Relation Age of Onset    Gynecology Mother         hysterectomy- pre ca    Hypertension Mother     Cancer Father         colon, prostate and kidney    Colon Cancer Father     Prostate Cancer Father     Dementia Maternal Grandmother     Osteoporosis Maternal Grandmother     Cancer Maternal Grandfather     Colon Cancer Maternal Grandfather     Cancer Paternal Grandfather     Colon Cancer Paternal Grandfather     Colon Polyps Sister     Hirschsprung's Disease Nephew     Breast Cancer Other     Cancer Other         stomach/esophogus    Other Cancer Other         Stomach    Myocardial Infarction Paternal Great-Grandfather     Myocardial Infarction Paternal Great-Grandmother     Cancer Maternal Great-Grandfather         Lung and Colon    Hypertension Maternal Great-Grandfather          Current Outpatient Medications   Medication Sig Dispense Refill    amLODIPine (NORVASC) 2.5 MG tablet Take 1 tablet (2.5 mg) by mouth daily. 90 tablet 3    Misc Natural Products (AM/PM PERIMENOPAUSE FORMULA PO) Take by mouth. Natural      MULTIVITAMIN TABS   OR 1 TABLET BY MOUTH DAILY       Allergies   Allergen Reactions    Amoxicillin-Pot Clavulanate Nausea and Vomiting and Diarrhea    Vicodin [Hydrocodone-Acetaminophen] Itching     Recent Labs   Lab Test 04/03/25  0846 04/14/23  1141   A1C 5.6 5.5   LDL  --  156*   HDL  --  59   TRIG  --  142   CR  --  0.88   GFRESTIMATED  --  81   POTASSIUM  --  4.2   TSH  --  0.85          Review of Systems  Constitutional, HEENT, cardiovascular, pulmonary, gi and gu systems are negative, except as otherwise noted.     Objective    Exam  /80   Pulse 98   Temp 98.5  F (36.9  C) (Tympanic)   Resp 20   Ht 1.715 m (5' 7.5\")   Wt 91.2 kg (201 lb)   LMP  (LMP Unknown)   SpO2 96%   BMI 31.02 kg/m     Estimated body mass index is 31.02 kg/m  as calculated from the following:    Height as of this encounter: " "1.715 m (5' 7.5\").    Weight as of this encounter: 91.2 kg (201 lb).    Physical Exam  Constitutional:       Appearance: Normal appearance. She is normal weight.   HENT:      Head: Normocephalic.      Right Ear: Tympanic membrane normal.      Left Ear: Tympanic membrane normal.      Nose: Nose normal.      Mouth/Throat:      Mouth: Mucous membranes are moist.      Pharynx: Oropharynx is clear.   Eyes:      Pupils: Pupils are equal, round, and reactive to light.   Cardiovascular:      Rate and Rhythm: Normal rate and regular rhythm.      Pulses: Normal pulses.      Heart sounds: Normal heart sounds.   Pulmonary:      Effort: Pulmonary effort is normal.      Breath sounds: Normal breath sounds.   Abdominal:      General: Abdomen is flat. Bowel sounds are normal.      Palpations: Abdomen is soft.   Musculoskeletal:      Right shoulder: Normal.      Left shoulder: Tenderness present. Decreased range of motion.        Arms:       Cervical back: Normal range of motion.      Comments: Pain at circled area increase with movement.    Skin:     General: Skin is warm and dry.   Neurological:      General: No focal deficit present.      Mental Status: She is alert and oriented to person, place, and time. Mental status is at baseline.   Psychiatric:         Mood and Affect: Mood normal.         Behavior: Behavior normal.         Thought Content: Thought content normal.         Judgment: Judgment normal.             Signed Electronically by: JONATHAN Bartholomew CNP    "

## 2025-04-03 NOTE — PATIENT INSTRUCTIONS
Patient Education   Preventive Care Advice   This is general advice given by our system to help you stay healthy. However, your care team may have specific advice just for you. Please talk to your care team about your preventive care needs.  Nutrition  Eat 5 or more servings of fruits and vegetables each day.  Try wheat bread, brown rice and whole grain pasta (instead of white bread, rice, and pasta).  Get enough calcium and vitamin D. Check the label on foods and aim for 100% of the RDA (recommended daily allowance).  Lifestyle  Exercise at least 150 minutes each week  (30 minutes a day, 5 days a week).  Do muscle strengthening activities 2 days a week. These help control your weight and prevent disease.  No smoking.  Wear sunscreen to prevent skin cancer.  Have a dental exam and cleaning every 6 months.  Yearly exams  See your health care team every year to talk about:  Any changes in your health.  Any medicines your care team has prescribed.  Preventive care, family planning, and ways to prevent chronic diseases.  Shots (vaccines)   HPV shots (up to age 26), if you've never had them before.  Hepatitis B shots (up to age 59), if you've never had them before.  COVID-19 shot: Get this shot when it's due.  Flu shot: Get a flu shot every year.  Tetanus shot: Get a tetanus shot every 10 years.  Pneumococcal, hepatitis A, and RSV shots: Ask your care team if you need these based on your risk.  Shingles shot (for age 50 and up)  General health tests  Diabetes screening:  Starting at age 35, Get screened for diabetes at least every 3 years.  If you are younger than age 35, ask your care team if you should be screened for diabetes.  Cholesterol test: At age 39, start having a cholesterol test every 5 years, or more often if advised.  Bone density scan (DEXA): At age 50, ask your care team if you should have this scan for osteoporosis (brittle bones).  Hepatitis C: Get tested at least once in your life.  STIs (sexually  transmitted infections)  Before age 24: Ask your care team if you should be screened for STIs.  After age 24: Get screened for STIs if you're at risk. You are at risk for STIs (including HIV) if:  You are sexually active with more than one person.  You don't use condoms every time.  You or a partner was diagnosed with a sexually transmitted infection.  If you are at risk for HIV, ask about PrEP medicine to prevent HIV.  Get tested for HIV at least once in your life, whether you are at risk for HIV or not.  Cancer screening tests  Cervical cancer screening: If you have a cervix, begin getting regular cervical cancer screening tests starting at age 21.  Breast cancer scan (mammogram): If you've ever had breasts, begin having regular mammograms starting at age 40. This is a scan to check for breast cancer.  Colon cancer screening: It is important to start screening for colon cancer at age 45.  Have a colonoscopy test every 10 years (or more often if you're at risk) Or, ask your provider about stool tests like a FIT test every year or Cologuard test every 3 years.  To learn more about your testing options, visit:   .  For help making a decision, visit:   https://bit.ly/ck68807.  Prostate cancer screening test: If you have a prostate, ask your care team if a prostate cancer screening test (PSA) at age 55 is right for you.  Lung cancer screening: If you are a current or former smoker ages 50 to 80, ask your care team if ongoing lung cancer screenings are right for you.  For informational purposes only. Not to replace the advice of your health care provider. Copyright   2023 [place]. All rights reserved. Clinically reviewed by the [facility] Transitions Program. [employer] [id number] - REV 01/24.

## 2025-04-17 ENCOUNTER — THERAPY VISIT (OUTPATIENT)
Dept: PHYSICAL THERAPY | Facility: CLINIC | Age: 50
End: 2025-04-17
Attending: NURSE PRACTITIONER
Payer: COMMERCIAL

## 2025-04-17 DIAGNOSIS — M25.512 LEFT SHOULDER PAIN, UNSPECIFIED CHRONICITY: ICD-10-CM

## 2025-04-17 PROCEDURE — 97110 THERAPEUTIC EXERCISES: CPT | Mod: GP | Performed by: PHYSICAL MEDICINE & REHABILITATION

## 2025-04-17 PROCEDURE — 97161 PT EVAL LOW COMPLEX 20 MIN: CPT | Mod: GP | Performed by: PHYSICAL MEDICINE & REHABILITATION

## 2025-04-17 ASSESSMENT — ACTIVITIES OF DAILY LIVING (ADL)
PLEASE_INDICATE_YOR_PRIMARY_REASON_FOR_REFERRAL_TO_THERAPY:: SHOULDER
REMOVING_SOMETHING_FROM_YOUR_BACK_POCKET: 2
PUSHING_WITH_THE_INVOLVED_ARM: 2
PUTTING_ON_YOUR_PANTS: 1
PUTTING_ON_A_SHIRT_THAT_BUTTONS_DOWN_THE_FRONT: 2
AT_ITS_WORST?: 4
WHEN_LYING_ON_THE_INVOLVED_SIDE: 5
TOUCHING_THE_BACK_OF_YOUR_NECK: 2
PLACING_AN_OBJECT_ON_A_HIGH_SHELF: 2
WASHING_YOUR_BACK: 3
CARRYING_A_HEAVY_OBJECT_OF_10_POUNDS: 3
REACHING_FOR_SOMETHING_ON_A_HIGH_SHELF: 2
PUTTING_ON_AN_UNDERSHIRT_OR_A_PULLOVER_SWEATER: 2
WASHING_YOUR_HAIR?: 2

## 2025-04-17 NOTE — PROGRESS NOTES
PHYSICAL THERAPY EVALUATION  Type of Visit: Evaluation    Fall Risk Screen:  Have you fallen 2 or more times in the past year?: No  Have you fallen and had an injury in the past year?: Yes  Is patient receiving Physical Therapy Services?: No    Subjective       Presenting condition or subjective complaint: left shoulder. Patient reports having constant pain for about 2-3 months now. She is not sure about cause of injury. Pain located at posterior upper arm and pec.   Date of onset: 04/03/25    Relevant medical history: High blood pressure; History of fractures; Migraines or headaches   Dates & types of surgery: multiple laposcopy for endometrosis,appendix 2017, partial hystercomy 2010,ovary removed 2022    Prior diagnostic imaging/testing results: X-ray     XR IMPRESSION: Normal joint spaces and alignment. No fracture.    Prior therapy history for the same diagnosis, illness or injury: No      Living Environment  Social support: With a significant other or spouse   Type of home: House   Stairs to enter the home: Yes 2 Is there a railing: No     Ramp: No   Stairs inside the home: Yes 10 Is there a railing: Yes     Help at home: None  Equipment owned:       Employment: Yes finance  Hobbies/Interests: gardening,yardwork and landscaping    Patient goals for therapy: get range of motion and lift again without pain or tightening; landscaping, gardening, build a deck/walls     Objective   SHOULDER EVALUATION  PAIN: Pain Level at Rest: 2/10  Pain Level with Use: 6/10  Pain Location: shoulder  Pain Quality: Aching and Sharp  Pain Frequency: intermittent  Pain is Worst: daytime  Pain is Exacerbated By: dressing, reaching above head, reaching behind back, lifting, carrying  Pain is Relieved By: cold, NSAIDs, and rest  INTEGUMENTARY (edema, incisions): WFL  POSTURE: Sitting Posture: Rounded shoulders, Forward head  BALANCE/PROPRIOCEPTION: WFL  ROM:  IR most painful, reach to bra line; flexion 155*; *; ER T2  reach  STRENGTH:  IR 4/5; ER 4/5; flex 4+/5; ABD 3+/5  FLEXIBILITY: Decreased upper trap L, Decreased pectoralis major L, Decreased pectoralis minor L, Decreased latissimus L  SPECIAL TESTS:  positive Crossover test, H-K, and Neer's  PALPATION:  TTP at ACJ, pec minor, and supraspinatus  JOINT MOBILITY:  impaired posterior and inferior GHJ glides    Assessment & Plan   CLINICAL IMPRESSIONS  Medical Diagnosis: Left shoulder pain, unspecified chronicity (M25.512)    Treatment Diagnosis: left shoulder pain and weakness   Impression/Assessment: Patient is a 50 year old female with L shoulder complaints.  The following significant findings have been identified: Pain, Decreased ROM/flexibility, Decreased joint mobility, and Impaired muscle performance. These impairments interfere with their ability to perform work tasks, recreational activities, and household chores as compared to previous level of function.     Clinical Decision Making (Complexity):  Clinical Presentation: Stable/Uncomplicated  Clinical Presentation Rationale: based on medical and personal factors listed in PT evaluation  Clinical Decision Making (Complexity): Low complexity    PLAN OF CARE  Treatment Interventions:  Modalities: Cupping, E-stim  Interventions: Manual Therapy, Neuromuscular Re-education, Therapeutic Activity, Therapeutic Exercise    Long Term Goals     PT Goal 1  Goal Identifier: STG  Goal Description: Patient to reach above her head full AROM without L shoulder pain.  Rationale: to maximize safety and independence with performance of ADLs and functional tasks  Target Date: 05/22/25  PT Goal 2  Goal Identifier: STG  Goal Description: Patient to carry 50 pounds at waist height for 50 feet with <3/10 L shoulder pain.  Rationale: to maximize safety and independence with performance of ADLs and functional tasks  Target Date: 05/22/25  PT Goal 3  Goal Identifier: LTG  Goal Description: Patient to complete landscaping and gardening tasks with  <2/10 L shoulder pain.  Rationale: to maximize safety and independence with performance of ADLs and functional tasks  Target Date: 06/26/25  PT Goal 4  Goal Identifier: LTG  Goal Description: Patient to be IND with HEP to aid functional recovery and reduce future occurence of pain and/or disability.  Rationale: to maximize safety and independence with performance of ADLs and functional tasks  Target Date: 06/26/25      Frequency of Treatment: 1x/week  Duration of Treatment: 10 weeks    Education Assessment:   Learner/Method: Patient;Pictures/Video    Risks and benefits of evaluation/treatment have been explained.   Patient/Family/caregiver agrees with Plan of Care.     Evaluation Time:     PT Eval, Low Complexity Minutes (11963): 13  Signing Clinician: Leonid Arellano PT

## 2025-04-23 ENCOUNTER — THERAPY VISIT (OUTPATIENT)
Dept: PHYSICAL THERAPY | Facility: CLINIC | Age: 50
End: 2025-04-23
Attending: NURSE PRACTITIONER
Payer: COMMERCIAL

## 2025-04-23 DIAGNOSIS — M25.512 LEFT SHOULDER PAIN, UNSPECIFIED CHRONICITY: Primary | ICD-10-CM

## 2025-04-23 PROCEDURE — 97110 THERAPEUTIC EXERCISES: CPT | Mod: GP | Performed by: PHYSICAL THERAPIST

## 2025-05-06 ENCOUNTER — THERAPY VISIT (OUTPATIENT)
Dept: PHYSICAL THERAPY | Facility: CLINIC | Age: 50
End: 2025-05-06
Attending: NURSE PRACTITIONER
Payer: COMMERCIAL

## 2025-05-06 DIAGNOSIS — M25.512 LEFT SHOULDER PAIN, UNSPECIFIED CHRONICITY: Primary | ICD-10-CM

## 2025-05-06 PROCEDURE — 97110 THERAPEUTIC EXERCISES: CPT | Mod: GP | Performed by: PHYSICAL THERAPIST

## 2025-05-19 ENCOUNTER — THERAPY VISIT (OUTPATIENT)
Dept: PHYSICAL THERAPY | Facility: CLINIC | Age: 50
End: 2025-05-19
Attending: NURSE PRACTITIONER
Payer: COMMERCIAL

## 2025-05-19 DIAGNOSIS — M25.512 LEFT SHOULDER PAIN, UNSPECIFIED CHRONICITY: Primary | ICD-10-CM

## 2025-05-19 PROCEDURE — 97110 THERAPEUTIC EXERCISES: CPT | Mod: GP | Performed by: PHYSICAL MEDICINE & REHABILITATION

## 2025-05-19 PROCEDURE — 97140 MANUAL THERAPY 1/> REGIONS: CPT | Mod: GP | Performed by: PHYSICAL MEDICINE & REHABILITATION

## 2025-06-05 ENCOUNTER — THERAPY VISIT (OUTPATIENT)
Dept: PHYSICAL THERAPY | Facility: CLINIC | Age: 50
End: 2025-06-05
Attending: NURSE PRACTITIONER
Payer: COMMERCIAL

## 2025-06-05 DIAGNOSIS — M25.512 LEFT SHOULDER PAIN, UNSPECIFIED CHRONICITY: Primary | ICD-10-CM

## 2025-06-05 PROCEDURE — 97110 THERAPEUTIC EXERCISES: CPT | Mod: GP | Performed by: PHYSICAL THERAPIST

## 2025-06-16 NOTE — PROGRESS NOTES
ASSESSMENT & PLAN    Rin was seen today for pain.    Diagnoses and all orders for this visit:    Rotator cuff impingement syndrome of left shoulder  -     POC US GUIDANCE NEEDLE PLACEMENT; Future  -     Large Joint Injection/Arthocentesis: L subacromial bursa    Chronic left shoulder pain      This issue is chronic and Unchanged.      # Left shoulder pain: Rin Garcia  was seen today for left shoulder pain. Symptoms had been going on for 5 months. On examination there are positive findings of pain with rotator cuff and biceps testing. Xrays from 4/3/25 showed mild subacromial cortical irregularity, suggestive of chronic impingement/ rotator cuff irritation.     Likely cause of patient's condition due to rotator cuff tendinopathy/ impingement with biceps tendinopathy to a lesser degree. Counseled patient on nature of condition and treatment options.     - Activity: activity as tolerated and continue with physical therapy  - Ice, heat, massage as needed  - Medications:      - ibuprofen 600mg or diclofenac (voltaren) gel three times daily for 1-2 weeks, then as needed.      - tylenol 1000mg three times daily as needed  - Injections: tolerated L subacromial cortisone injection    Follow-up:   - Could repeat injection after 3 months if injection was helpful but pain returns  - If no relief after 3 weeks, could consider MRI or biceps tendon injection     Shoaib Couch MD  [facility] [place]    -----  Chief Complaint   Patient presents with    Left Shoulder - Pain       SUBJECTIVE  Rin Garcia is a/an 50 year old female who is seen as a self referral for evaluation of left shoulder. She would like to discuss a possible steroid injection today.    The patient is seen by themselves.  The patient is Right handed    Onset: 4-5 month(s) ago. Reports insidious onset without acute precipitating event.   Location of Pain: left anterior shoulder, posterior upper arm  Worsened by:  external rotation, abduction, adduction  Better with: physical therapy, ice, heat  Treatments tried: physical therapy (5 visits), ice, heat  Associated symptoms: stiffness of shoulder    Orthopedic/Surgical history: YES - history of intermittent bilateral shoulder pain  Social History/Occupation: desk job; active with landscaping, yard work      REVIEW OF SYSTEMS:  Review of Systems    OBJECTIVE:  LMP  (LMP Unknown)    General: healthy, alert and in no distress  Skin: no suspicious lesions or rash.  CV: distal perfusion intact   Resp: normal respiratory effort without conversational dyspnea   Psych: normal mood and affect  Gait: NORMAL  Neuro: Normal light sensory exam of left upper extremity    LEFT SHOULDER  Inspection:    no swelling, bruising, discoloration, or obvious deformity or asymmetry  Palpation:    Tender about the proximal biceps tendon and supraspinatus insertion. Remainder of bony and tendinous landmarks are nontender.  Active Range of Motion:     Abduction 1650 and painful over 80, FF 1650, , IR L4.    Strength:    Scapular plane abduction 5/5, painful,  ER 5/5, painful, IR 5/5, biceps 5-/5, painful  Special Tests:    Positive: Neer's, Garcias', supraspinatus (empty can), and Speed's       RADIOLOGY:  Final results and radiologist's interpretation, available in the [facility] health record.  Images were reviewed with the patient in the office today.  My personal interpretation of the performed imaging:   - Xrays from 4/3/25 showed mild subacromial cortical irregularity, suggestive of chronic impingement/ rotator cuff irritation, otherwise no arthritic changes    EXAM: XR SHOULDER LEFT G/E 3 VIEWS  LOCATION: [facility]  DATE: 4/3/2025     INDICATION:  Shoulder strain, left, initial encounter  COMPARISON: None.                                                                      IMPRESSION: Normal joint spaces and alignment. No fracture.    Large Joint  Injection/Arthocentesis: L subacromial bursa    Date/Time: 6/18/2025 8:58 AM    Performed by: Shoaib Couch MD  Authorized by: Shoaib Couch MD    Indications:  Pain  Needle Size:  25 G  Guidance: ultrasound    Approach:  Posterolateral  Location:  Shoulder      Site:  L subacromial bursa  Medications:  6 mg betamethasone acet & sod phos 6 (3-3) MG/ML; 2 mL ROPivacaine 5 MG/ML  Outcome:  Tolerated well, no immediate complications  Procedure discussed: discussed risks, benefits, and alternatives    Consent Given by:  Patient  Timeout: timeout called immediately prior to procedure    Prep: patient was prepped and draped in usual sterile fashion     Patient tolerated left subacromial cortisone injection. Ultrasound guidance was required to ensure correct needle placement for injection and avoid vital surrounding structures. Ultrasound guided images were permanently stored. Aftercare instructions given to patient. Plan to follow-up as above.     Shoaib Couch MD        Patient was independently assessed by me and was deemed appropriate for this procedure. Risks and benefits were discussed with good understanding including, but not limited to, the risks of bleeding, reaction to the medication, infection, potential delay of any surgical intervention by 3 months, temporary elevation of blood sugars, weakening of the tendon, and the possibility of the treatment being ineffective. The patient tolerated the procedure well with no complications.    After visit care instructions were discussed in person and provided in AVS.

## 2025-06-18 ENCOUNTER — OFFICE VISIT (OUTPATIENT)
Dept: ORTHOPEDICS | Facility: CLINIC | Age: 50
End: 2025-06-18
Payer: COMMERCIAL

## 2025-06-18 ENCOUNTER — HOSPITAL ENCOUNTER (OUTPATIENT)
Dept: MAMMOGRAPHY | Facility: CLINIC | Age: 50
Discharge: HOME | End: 2025-06-18
Attending: NURSE PRACTITIONER
Payer: COMMERCIAL

## 2025-06-18 DIAGNOSIS — M75.42 ROTATOR CUFF IMPINGEMENT SYNDROME OF LEFT SHOULDER: Primary | ICD-10-CM

## 2025-06-18 DIAGNOSIS — G89.29 CHRONIC LEFT SHOULDER PAIN: ICD-10-CM

## 2025-06-18 DIAGNOSIS — M25.512 CHRONIC LEFT SHOULDER PAIN: ICD-10-CM

## 2025-06-18 DIAGNOSIS — Z12.31 VISIT FOR SCREENING MAMMOGRAM: ICD-10-CM

## 2025-06-18 PROCEDURE — 20611 DRAIN/INJ JOINT/BURSA W/US: CPT | Mod: LT | Performed by: STUDENT IN AN ORGANIZED HEALTH CARE EDUCATION/TRAINING PROGRAM

## 2025-06-18 PROCEDURE — 77063 BREAST TOMOSYNTHESIS BI: CPT

## 2025-06-18 PROCEDURE — 99204 OFFICE O/P NEW MOD 45 MIN: CPT | Mod: 25 | Performed by: STUDENT IN AN ORGANIZED HEALTH CARE EDUCATION/TRAINING PROGRAM

## 2025-06-18 RX ORDER — BETAMETHASONE SODIUM PHOSPHATE AND BETAMETHASONE ACETATE 3; 3 MG/ML; MG/ML
6 INJECTION, SUSPENSION INTRA-ARTICULAR; INTRALESIONAL; INTRAMUSCULAR; SOFT TISSUE
Status: COMPLETED | OUTPATIENT
Start: 2025-06-18 | End: 2025-06-18

## 2025-06-18 RX ORDER — ROPIVACAINE HYDROCHLORIDE 5 MG/ML
2 INJECTION, SOLUTION EPIDURAL; INFILTRATION; PERINEURAL
Status: COMPLETED | OUTPATIENT
Start: 2025-06-18 | End: 2025-06-18

## 2025-06-18 RX ADMIN — BETAMETHASONE SODIUM PHOSPHATE AND BETAMETHASONE ACETATE 6 MG: 3; 3 INJECTION, SUSPENSION INTRA-ARTICULAR; INTRALESIONAL; INTRAMUSCULAR; SOFT TISSUE at 08:58

## 2025-06-18 RX ADMIN — ROPIVACAINE HYDROCHLORIDE 2 ML: 5 INJECTION, SOLUTION EPIDURAL; INFILTRATION; PERINEURAL at 08:58

## 2025-06-18 NOTE — LETTER
6/18/2025      Rin Garcia  [address] [facility] [address]      Dear Colleague,    Thank you for referring your patient, Rin Garcia, to the [facility]. Please see a copy of my visit note below.    ASSESSMENT & PLAN    Rin was seen today for pain.    Diagnoses and all orders for this visit:    Rotator cuff impingement syndrome of left shoulder  -     POC US GUIDANCE NEEDLE PLACEMENT; Future  -     Large Joint Injection/Arthocentesis: L subacromial bursa    Chronic left shoulder pain      This issue is chronic and Unchanged.      # Left shoulder pain: Rin Garcia  was seen today for left shoulder pain. Symptoms had been going on for 5 months. On examination there are positive findings of pain with rotator cuff and biceps testing. Xrays from 4/3/25 showed mild subacromial cortical irregularity, suggestive of chronic impingement/ rotator cuff irritation.     Likely cause of patient's condition due to rotator cuff tendinopathy/ impingement with biceps tendinopathy to a lesser degree. Counseled patient on nature of condition and treatment options.     - Activity: activity as tolerated and continue with physical therapy  - Ice, heat, massage as needed  - Medications:      - ibuprofen 600mg or diclofenac (voltaren) gel three times daily for 1-2 weeks, then as needed.      - tylenol 1000mg three times daily as needed  - Injections: tolerated L subacromial cortisone injection    Follow-up:   - Could repeat injection after 3 months if injection was helpful but pain returns  - If no relief after 3 weeks, could consider MRI or biceps tendon injection     Shoaib Couch MD  [facility]    -----  Chief Complaint   Patient presents with     Left Shoulder - Pain       SUBJECTIVE  Rin Garcia is a/an 50 year old female who is seen as a self referral for evaluation of left shoulder. She would like to discuss a possible  steroid injection today.    The patient is seen by themselves.  The patient is Right handed    Onset: 4-5 month(s) ago. Reports insidious onset without acute precipitating event.   Location of Pain: left anterior shoulder, posterior upper arm  Worsened by: external rotation, abduction, adduction  Better with: physical therapy, ice, heat  Treatments tried: physical therapy (5 visits), ice, heat  Associated symptoms: stiffness of shoulder    Orthopedic/Surgical history: YES - history of intermittent bilateral shoulder pain  Social History/Occupation: desk job; active with landscaping, yard work      REVIEW OF SYSTEMS:  Review of Systems    OBJECTIVE:  LMP  (LMP Unknown)    General: healthy, alert and in no distress  Skin: no suspicious lesions or rash.  CV: distal perfusion intact   Resp: normal respiratory effort without conversational dyspnea   Psych: normal mood and affect  Gait: NORMAL  Neuro: Normal light sensory exam of left upper extremity    LEFT SHOULDER  Inspection:    no swelling, bruising, discoloration, or obvious deformity or asymmetry  Palpation:    Tender about the proximal biceps tendon and supraspinatus insertion. Remainder of bony and tendinous landmarks are nontender.  Active Range of Motion:     Abduction 1650 and painful over 80, FF 1650, , IR L4.    Strength:    Scapular plane abduction 5/5, painful,  ER 5/5, painful, IR 5/5, biceps 5-/5, painful  Special Tests:    Positive: Neer's, Garcias', supraspinatus (empty can), and Speed's       RADIOLOGY:  Final results and radiologist's interpretation, available in the [facility] health record.  Images were reviewed with the patient in the office today.  My personal interpretation of the performed imaging:   - Xrays from 4/3/25 showed mild subacromial cortical irregularity, suggestive of chronic impingement/ rotator cuff irritation, otherwise no arthritic changes    EXAM: XR SHOULDER LEFT G/E 3 VIEWS  LOCATION: [facility]  [place]  DATE: 4/3/2025     INDICATION:  Shoulder strain, left, initial encounter  COMPARISON: None.                                                                      IMPRESSION: Normal joint spaces and alignment. No fracture.    Large Joint Injection/Arthocentesis: L subacromial bursa    Date/Time: 6/18/2025 8:58 AM    Performed by: Shoaib Couch MD  Authorized by: Shoaib Couch MD    Indications:  Pain  Needle Size:  25 G  Guidance: ultrasound    Approach:  Posterolateral  Location:  Shoulder      Site:  L subacromial bursa  Medications:  6 mg betamethasone acet & sod phos 6 (3-3) MG/ML; 2 mL ROPivacaine 5 MG/ML  Outcome:  Tolerated well, no immediate complications  Procedure discussed: discussed risks, benefits, and alternatives    Consent Given by:  Patient  Timeout: timeout called immediately prior to procedure    Prep: patient was prepped and draped in usual sterile fashion     Patient tolerated left subacromial cortisone injection. Ultrasound guidance was required to ensure correct needle placement for injection and avoid vital surrounding structures. Ultrasound guided images were permanently stored. Aftercare instructions given to patient. Plan to follow-up as above.     Shoaib Couch MD        Patient was independently assessed by me and was deemed appropriate for this procedure. Risks and benefits were discussed with good understanding including, but not limited to, the risks of bleeding, reaction to the medication, infection, potential delay of any surgical intervention by 3 months, temporary elevation of blood sugars, weakening of the tendon, and the possibility of the treatment being ineffective. The patient tolerated the procedure well with no complications.    After visit care instructions were discussed in person and provided in AVS.         Again, thank you for allowing me to participate in the care of your patient.        Sincerely,        Shoaib Couch  MD    Electronically signed

## 2025-06-18 NOTE — PATIENT INSTRUCTIONS
[facility] Injection Discharge Instructions    Procedure: left subacromial (rotator cuff bursa) cortisone injection    You may shower, however avoid swimming, tub baths or hot tubs for 24 hours following your procedure  You may have a mild to moderate increase in pain for several days following the injection.  It may take up to 14 days for the steroid medication to start working although you may feel the effect as early as a few days after the procedure.  You may use ice packs for 10-15 minutes, 3 to 4 times a day at the injection site for comfort  You may use anti-inflammatory medications (such as Ibuprofen or Aleve or Advil) or Tylenol for pain control if necessary and allowed to by your regular doctor  If you were fasting, you may resume your normal diet and medications after the procedure  If you have diabetes, check your blood sugar more frequently than usual as your blood sugar may be higher than normal for 10-14 days following a steroid injection. Contact your doctor who manages your diabetes if your blood sugar is higher than usual    If you experience any of the following, call [facility] @ [phone] or [phone]  - Fever over 100 degree F  - Swelling, bleeding, redness, drainage, warmth at the injection site  - New or worsening pain    Follow-up:   - Could repeat injection after 3 months if injection was helpful but pain returns  - If no relief after 3 weeks, could consider MRI or biceps tendon injection

## 2025-07-08 ENCOUNTER — THERAPY VISIT (OUTPATIENT)
Dept: PHYSICAL THERAPY | Facility: CLINIC | Age: 50
End: 2025-07-08
Attending: NURSE PRACTITIONER
Payer: COMMERCIAL

## 2025-07-08 ENCOUNTER — OFFICE VISIT (OUTPATIENT)
Dept: OBGYN | Facility: CLINIC | Age: 50
End: 2025-07-08
Payer: COMMERCIAL

## 2025-07-08 VITALS
SYSTOLIC BLOOD PRESSURE: 159 MMHG | HEIGHT: 68 IN | DIASTOLIC BLOOD PRESSURE: 90 MMHG | BODY MASS INDEX: 31.02 KG/M2 | TEMPERATURE: 98.1 F | WEIGHT: 204.7 LBS | HEART RATE: 91 BPM | RESPIRATION RATE: 16 BRPM

## 2025-07-08 DIAGNOSIS — Z80.0 FAMILY HISTORY OF COLON CANCER: ICD-10-CM

## 2025-07-08 DIAGNOSIS — N39.46 MIXED STRESS AND URGE URINARY INCONTINENCE: ICD-10-CM

## 2025-07-08 DIAGNOSIS — Z01.419 ENCOUNTER FOR ANNUAL ROUTINE GYNECOLOGICAL EXAMINATION: Primary | ICD-10-CM

## 2025-07-08 DIAGNOSIS — M25.512 LEFT SHOULDER PAIN, UNSPECIFIED CHRONICITY: Primary | ICD-10-CM

## 2025-07-08 LAB
ALBUMIN UR-MCNC: NEGATIVE MG/DL
APPEARANCE UR: ABNORMAL
BACTERIA #/AREA URNS HPF: ABNORMAL /HPF
BILIRUB UR QL STRIP: NEGATIVE
COLOR UR AUTO: YELLOW
GLUCOSE UR STRIP-MCNC: NEGATIVE MG/DL
HGB UR QL STRIP: ABNORMAL
HYALINE CASTS #/AREA URNS LPF: ABNORMAL /LPF
KETONES UR STRIP-MCNC: NEGATIVE MG/DL
LEUKOCYTE ESTERASE UR QL STRIP: NEGATIVE
MUCOUS THREADS #/AREA URNS LPF: PRESENT /LPF
NITRATE UR QL: NEGATIVE
PH UR STRIP: 5.5 [PH] (ref 5–7)
RBC #/AREA URNS AUTO: ABNORMAL /HPF
SP GR UR STRIP: 1.02 (ref 1–1.03)
SQUAMOUS #/AREA URNS AUTO: ABNORMAL /LPF
UROBILINOGEN UR STRIP-ACNC: 0.2 E.U./DL
WBC #/AREA URNS AUTO: ABNORMAL /HPF

## 2025-07-08 PROCEDURE — 3080F DIAST BP >= 90 MM HG: CPT | Performed by: OBSTETRICS & GYNECOLOGY

## 2025-07-08 PROCEDURE — 81001 URINALYSIS AUTO W/SCOPE: CPT | Performed by: OBSTETRICS & GYNECOLOGY

## 2025-07-08 PROCEDURE — 97140 MANUAL THERAPY 1/> REGIONS: CPT | Mod: GP | Performed by: PHYSICAL THERAPIST

## 2025-07-08 PROCEDURE — 97110 THERAPEUTIC EXERCISES: CPT | Mod: GP | Performed by: PHYSICAL THERAPIST

## 2025-07-08 PROCEDURE — 99213 OFFICE O/P EST LOW 20 MIN: CPT | Performed by: OBSTETRICS & GYNECOLOGY

## 2025-07-08 PROCEDURE — 87086 URINE CULTURE/COLONY COUNT: CPT | Performed by: OBSTETRICS & GYNECOLOGY

## 2025-07-08 PROCEDURE — 3077F SYST BP >= 140 MM HG: CPT | Performed by: OBSTETRICS & GYNECOLOGY

## 2025-07-08 NOTE — NURSING NOTE
"Initial BP (!) 159/90 (BP Location: Right arm, Patient Position: Sitting, Cuff Size: Adult Regular)   Pulse 91   Temp 98.1  F (36.7  C) (Tympanic)   Resp 16   Ht 1.715 m (5' 7.5\")   Wt 92.9 kg (204 lb 11.2 oz)   LMP  (LMP Unknown)   BMI 31.59 kg/m   Estimated body mass index is 31.59 kg/m  as calculated from the following:    Height as of this encounter: 1.715 m (5' 7.5\").    Weight as of this encounter: 92.9 kg (204 lb 11.2 oz). .    "

## 2025-07-08 NOTE — PROGRESS NOTES
[facility]    Gynecology Office Note    CC:   Chief Complaint   Patient presents with    Follow Up        HPI: Rin Garcia is a 50 year old  who presents for annual GYN exam. Patient is perimenopausal.  Was having some hot flashes and night sweats as well as increased moodiness.  Started a herbal menopause supplement and reports some improvement in moodiness and decrease of intensity of hot flashes.      Patient reports urinary incontinence.  Has symptoms with both stress and urge incontinence.  Reports the urgency incontinence is not as frequent as the stress UI.    Reports some areolar itching.        GYN Hx:     Patient is menopausal: likely perimenopausal  History of endometriosis. Was on Lupron for a few months in her 20s with bad side effects. Then maintained on OCPs for >10 years.   History of right oophorectomy (2022) due to persistent right sided pelvic pain.      No LMP recorded (lmp unknown). Patient has had a hysterectomy.     Last Pap Smear:         Lab Results   Component Value Date     PAP NIL 2012   S/p hysterectomy, no need for continued pap smear testing.    ROS: A 10 pt ROS was completed and found to be otherwise negative unless mentioned in the HPI.     PMH:   Past Medical History:   Diagnosis Date    Abnormal Pap smear of cervix remote past    ?details?    Endometriosis     Endometriosis 2009    Hypertension 10yrs    Prehypertension. not on meds    PONV (postoperative nausea and vomiting)        PSHx:   Past Surgical History:   Procedure Laterality Date    APPENDECTOMY      BIOPSY BREAST      COLONOSCOPY  2019    Family history, on 3yr plan    GYN SURGERY      hysterectomy    HYSTERECTOMY, PAP NO LONGER INDICATED      LAPAROSCOPIC OOPHORECTOMY Right 2022    Procedure: Right oophorectomy;  Surgeon: Guilherme Juan MD;  Location: [place] OR    LAPAROSCOPY DIAGNOSTIC (GYN) Bilateral 2022    Procedure: diagnostic Laparoscopy and lysis  of adhesion;  Surgeon: Guilherme Juan MD;  Location: [place] OR    PELVIS LAPAROSCOPY,DX      ,,       OBHx:   OB History    Para Term  AB Living   1 1 1 0 0 1   SAB IAB Ectopic Multiple Live Births   0 0 0 0 0      # Outcome Date GA Lbr Poncho/2nd Weight Sex Type Anes PTL Lv   1 Term               Name: Santhosh       Medications:   Current Outpatient Medications   Medication Sig Dispense Refill    amLODIPine (NORVASC) 2.5 MG tablet Take 1 tablet (2.5 mg) by mouth daily. 90 tablet 3    Misc Natural Products (AM/PM PERIMENOPAUSE FORMULA PO) Take by mouth. Natural      MULTIVITAMIN TABS   OR 1 TABLET BY MOUTH DAILY       No current facility-administered medications for this visit.       Allergies:      Allergies   Allergen Reactions    Amoxicillin-Pot Clavulanate Nausea and Vomiting and Diarrhea    Vicodin [Hydrocodone-Acetaminophen] Itching       Social History:   Social History     Socioeconomic History    Marital status: Single     Spouse name: Not on file    Number of children: 1    Years of education: Not on file    Highest education level: Not on file   Occupational History     Employer: UNEMPLOYED   Tobacco Use    Smoking status: Never    Smokeless tobacco: Never   Vaping Use    Vaping status: Never Used    Passive vaping exposure: Yes   Substance and Sexual Activity    Alcohol use: Yes     Comment: One drink ocassionally, biweekly/monthly    Drug use: No    Sexual activity: Yes     Partners: Male     Birth control/protection: Female Surgical, None     Comment: hyst   Other Topics Concern    Parent/sibling w/ CABG, MI or angioplasty before 65F 55M? No   Social History Narrative    Not on file     Social Drivers of Health     Financial Resource Strain: Low Risk  (3/31/2025)    Financial Resource Strain     Within the past 12 months, have you or your family members you live with been unable to get utilities (heat, electricity) when it was really needed?: No   Food Insecurity: Low Risk   (3/31/2025)    Food Insecurity     Within the past 12 months, did you worry that your food would run out before you got money to buy more?: No     Within the past 12 months, did the food you bought just not last and you didn t have money to get more?: No   Transportation Needs: Low Risk  (3/31/2025)    Transportation Needs     Within the past 12 months, has lack of transportation kept you from medical appointments, getting your medicines, non-medical meetings or appointments, work, or from getting things that you need?: No   Physical Activity: Insufficiently Active (3/31/2025)    Exercise Vital Sign     Days of Exercise per Week: 3 days     Minutes of Exercise per Session: 30 min   Stress: Patient Declined (3/31/2025)    [employer] - Occupational Stress Questionnaire     Feeling of Stress : Patient declined   Social Connections: Unknown (3/31/2025)    Social Connection and Isolation Panel [NHANES]     Frequency of Communication with Friends and Family: Not on file     Frequency of Social Gatherings with Friends and Family: Once a week     Attends Confucianism Services: Not on file     Active Member of Clubs or Organizations: Not on file     Attends Club or Organization Meetings: Not on file     Marital Status: Not on file   Interpersonal Safety: Low Risk  (4/3/2025)    Interpersonal Safety     Do you feel physically and emotionally safe where you currently live?: Yes     Within the past 12 months, have you been hit, slapped, kicked or otherwise physically hurt by someone?: No     Within the past 12 months, have you been humiliated or emotionally abused in other ways by your partner or ex-partner?: No   Housing Stability: Low Risk  (3/31/2025)    Housing Stability     Do you have housing? : Yes     Are you worried about losing your housing?: No         Family History:   Family History   Problem Relation Age of Onset    Gynecology Mother         hysterectomy- pre ca    Hypertension Mother   "   Cancer Father         colon, prostate and kidney    Colon Cancer Father     Prostate Cancer Father     Dementia Maternal Grandmother     Osteoporosis Maternal Grandmother     Cancer Maternal Grandfather     Colon Cancer Maternal Grandfather     Cancer Paternal Grandfather     Colon Cancer Paternal Grandfather     Colon Polyps Sister     Hirschsprung's Disease Nephew     Myocardial Infarction Paternal Great-Grandfather     Myocardial Infarction Paternal Great-Grandmother     Cancer Maternal Great-Grandfather         Lung and Colon    Hypertension Maternal Great-Grandfather     Breast Cancer Other     Cancer Other         stomach/esophogus    Other Cancer Other         Stomach    Cerebrovascular Disease Paternal Aunt        Physical Exam:   Vitals:    07/08/25 0921   BP: (!) 159/90   BP Location: Right arm   Patient Position: Sitting   Cuff Size: Adult Regular   Pulse: 91   Resp: 16   Temp: 98.1  F (36.7  C)   TempSrc: Tympanic   Weight: 92.9 kg (204 lb 11.2 oz)   Height: 1.715 m (5' 7.5\")      Estimated body mass index is 31.59 kg/m  as calculated from the following:    Height as of this encounter: 1.715 m (5' 7.5\").    Weight as of this encounter: 92.9 kg (204 lb 11.2 oz).    General appearance: well-hydrated, A&O x 3, no apparent distress  Lungs: Equal expansion bilaterally, no accessory muscle use  Heart: No heaves or thrills.   Constitutional: See vitals  Abdomen: Soft, non-tender, non-distended. No rebound, rigidity, or guarding.  Neuro: CN II-XII grossly intact  Genitourinary:  External genitalia: no erythema, no lesions.   Urethral meatus appropriate location without lesions or prolapse  Urethra: No masses, tenderness, or scarring  Bladder no fullness, masses, or tenderness. No prolapse  Anus and Perineum: Unremarkable, no visible lesions. No rectocele.  Vagina: Normal, healthy pink mucosa without any lesions. Physiologic vaginal discharge. No prolapse  Cervix: surgically absent  Adnexa: no masses or " tenderness bilaterally.      Assessment and Plan:     Encounter Diagnoses   Name Primary?    Mixed stress and urge urinary incontinence     Family history of colon cancer     Encounter for annual routine gynecological examination Yes     Mixed urinary incontinence: Will check UA and UCx today.  No prolapse on exam today. Discussed pelvic floor exercises.  Patient would like to try these at home prior to pelvic floor physical therapy.  She is not interested in any other management at this time.    Areolar itching: mammogram [facility] 1 June 2025. Discussed lotion or hydrocortisone cream.    Family history of colon cancer and personal history of colon polyps: Due for colonoscopy at the end of this year.    Cristal Allen,

## 2025-07-09 ENCOUNTER — PATIENT OUTREACH (OUTPATIENT)
Dept: CARE COORDINATION | Facility: CLINIC | Age: 50
End: 2025-07-09
Payer: COMMERCIAL

## 2025-07-10 ENCOUNTER — OFFICE VISIT (OUTPATIENT)
Dept: UROLOGY | Facility: CLINIC | Age: 50
End: 2025-07-10
Attending: OBSTETRICS & GYNECOLOGY
Payer: COMMERCIAL

## 2025-07-10 VITALS
RESPIRATION RATE: 18 BRPM | HEART RATE: 87 BPM | DIASTOLIC BLOOD PRESSURE: 103 MMHG | TEMPERATURE: 98.2 F | SYSTOLIC BLOOD PRESSURE: 154 MMHG | OXYGEN SATURATION: 97 %

## 2025-07-10 DIAGNOSIS — R31.29 MICROSCOPIC HEMATURIA: ICD-10-CM

## 2025-07-10 LAB — BACTERIA UR CULT: NO GROWTH

## 2025-07-10 NOTE — PROGRESS NOTES
Chief Complaint:   Microscopic hematuria         History of Present Illness:   Rin Garcia is a 50 year old female with a history of HTN who presents for evaluation of microscopic hematuria.     The patient denies any gross hematuria. She reports mixed incontinence. She noticed lower abdominal and lower back pain that started yesterday. She denies a history of kidney stones.     Her mother is a frequent kidney stone former. Her father had both kidney and prostate cancer.     She had a hysterectomy in 2010.     She is a never smoker.          Past Medical History:     Past Medical History:   Diagnosis Date    Abnormal Pap smear of cervix remote past    ?details?    Endometriosis     Endometriosis 08/18/2009    Hypertension 10yrs    Prehypertension. not on meds    PONV (postoperative nausea and vomiting)             Past Surgical History:     Past Surgical History:   Procedure Laterality Date    APPENDECTOMY      BIOPSY BREAST      COLONOSCOPY  11/2019    Family history, on 3yr plan    GYN SURGERY      hysterectomy    HYSTERECTOMY, PAP NO LONGER INDICATED      LAPAROSCOPIC OOPHORECTOMY Right 6/6/2022    Procedure: Right oophorectomy;  Surgeon: Guilherme Juan MD;  Location: [facility]    LAPAROSCOPY DIAGNOSTIC (GYN) Bilateral 6/6/2022    Procedure: diagnostic Laparoscopy and lysis of adhesion;  Surgeon: Guilherme Juan MD;  Location: [facility]    PELVIS LAPAROSCOPY,DX      2003,04,06            Medications     Current Outpatient Medications   Medication Sig Dispense Refill    amLODIPine (NORVASC) 2.5 MG tablet Take 1 tablet (2.5 mg) by mouth daily. 90 tablet 3    Misc Natural Products (AM/PM PERIMENOPAUSE FORMULA PO) Take by mouth. Natural      MULTIVITAMIN TABS   OR 1 TABLET BY MOUTH DAILY       No current facility-administered medications for this visit.            Allergies:   Amoxicillin-pot clavulanate and Vicodin [hydrocodone-acetaminophen]         Review of Systems:  From intake questionnaire    Negative 14 system review except as noted on HPI, nurse's note.         Physical Exam:   Patient is a 50 year old  female   Vitals: Blood pressure (!) 154/103, pulse 87, temperature 98.2  F (36.8  C), temperature source Tympanic, resp. rate 18, SpO2 97%, not currently breastfeeding.  General Appearance Adult: Alert, no acute distress, oriented.  Lungs: Non-labored breathing.  Heart: No obvious jugular venous distension present.  Neuro: Alert, oriented, speech and mentation normal    PVR: 5 mL      Labs and Pathology:    I personally reviewed all applicable laboratory data and went over findings with patient  Significant for:    CBC RESULTS:  Recent Labs   Lab Test 04/03/25  0846 04/14/23  1141   WBC 9.1 9.5   HGB 14.6 14.3    262        BMP RESULTS:  Recent Labs   Lab Test 04/03/25  0846 04/14/23  1141    137   POTASSIUM 4.6 4.2   CHLORIDE 101 102   CO2 25 24   ANIONGAP 11 11   * 100*   BUN 15.6 14.2   CR 0.88 0.88   GFRESTIMATED 80 81   SYDNEE 9.9 9.5       UA RESULTS:   Recent Labs   Lab Test 07/08/25  0952   SG 1.025   URINEPH 5.5   NITRITE Negative   RBCU 5-10*   WBCU 0-5            Assessment and Plan:     Assessment: Ms. Rin Garcia is a pleasant 50 year old female with microscopic hematuria present dating back to 2006. The differential diagnosis at this point includes stone disease, infection, vaginal contaminant, urothelial malignancy, renal disorder versus another yet unknown diagnosis.    Plan:  At this time, recommend proceeding with comprehensive hematuria evaluation to include:  - CT urogram for upper tract imaging.  - Cystoscopy to evaluate the interior of the bladder. Follow up for hematuria as recommended by urologist performing cystoscopic evaluation.      Dari Heredia PA-C  Department of Urology

## 2025-07-10 NOTE — PROGRESS NOTES
"Initial BP (!) 154/103 (BP Location: Right arm, Patient Position: Chair, Cuff Size: Adult Large)   Pulse 87   Temp 98.2  F (36.8  C) (Tympanic)   Resp 18   LMP  (LMP Unknown)   SpO2 97%  Estimated body mass index is 31.59 kg/m  as calculated from the following:    Height as of 7/8/25: 1.715 m (5' 7.5\").    Weight as of 7/8/25: 92.9 kg (204 lb 11.2 oz). .    Bladder Scan = 5 ml   "

## 2025-08-05 ENCOUNTER — HOSPITAL ENCOUNTER (OUTPATIENT)
Dept: CT IMAGING | Facility: CLINIC | Age: 50
Discharge: HOME | End: 2025-08-05
Attending: STUDENT IN AN ORGANIZED HEALTH CARE EDUCATION/TRAINING PROGRAM
Payer: COMMERCIAL

## 2025-08-05 ENCOUNTER — OFFICE VISIT (OUTPATIENT)
Dept: UROLOGY | Facility: CLINIC | Age: 50
End: 2025-08-05
Payer: COMMERCIAL

## 2025-08-05 VITALS
DIASTOLIC BLOOD PRESSURE: 86 MMHG | HEIGHT: 68 IN | OXYGEN SATURATION: 98 % | SYSTOLIC BLOOD PRESSURE: 149 MMHG | WEIGHT: 204 LBS | HEART RATE: 82 BPM | BODY MASS INDEX: 30.92 KG/M2

## 2025-08-05 DIAGNOSIS — R31.29 MICROSCOPIC HEMATURIA: ICD-10-CM

## 2025-08-05 DIAGNOSIS — R31.29 MICROSCOPIC HEMATURIA: Primary | ICD-10-CM

## 2025-08-05 PROCEDURE — 74178 CT ABD&PLV WO CNTR FLWD CNTR: CPT

## 2025-08-05 PROCEDURE — 3077F SYST BP >= 140 MM HG: CPT | Performed by: STUDENT IN AN ORGANIZED HEALTH CARE EDUCATION/TRAINING PROGRAM

## 2025-08-05 PROCEDURE — 1125F AMNT PAIN NOTED PAIN PRSNT: CPT | Performed by: STUDENT IN AN ORGANIZED HEALTH CARE EDUCATION/TRAINING PROGRAM

## 2025-08-05 PROCEDURE — 250N000009 HC RX 250: Performed by: STUDENT IN AN ORGANIZED HEALTH CARE EDUCATION/TRAINING PROGRAM

## 2025-08-05 PROCEDURE — 250N000011 HC RX IP 250 OP 636: Performed by: STUDENT IN AN ORGANIZED HEALTH CARE EDUCATION/TRAINING PROGRAM

## 2025-08-05 PROCEDURE — 52000 CYSTOURETHROSCOPY: CPT | Performed by: STUDENT IN AN ORGANIZED HEALTH CARE EDUCATION/TRAINING PROGRAM

## 2025-08-05 PROCEDURE — 3079F DIAST BP 80-89 MM HG: CPT | Performed by: STUDENT IN AN ORGANIZED HEALTH CARE EDUCATION/TRAINING PROGRAM

## 2025-08-05 RX ORDER — IOPAMIDOL 755 MG/ML
99 INJECTION, SOLUTION INTRAVASCULAR ONCE
Status: COMPLETED | OUTPATIENT
Start: 2025-08-05 | End: 2025-08-05

## 2025-08-05 RX ADMIN — IOPAMIDOL 99 ML: 755 INJECTION, SOLUTION INTRAVENOUS at 12:21

## 2025-08-05 RX ADMIN — SODIUM CHLORIDE 100 ML: 9 INJECTION, SOLUTION INTRAVENOUS at 12:23

## 2025-08-05 ASSESSMENT — PAIN SCALES - GENERAL: PAINLEVEL_OUTOF10: SEVERE PAIN (10)

## 2025-08-14 ENCOUNTER — THERAPY VISIT (OUTPATIENT)
Dept: PHYSICAL THERAPY | Facility: CLINIC | Age: 50
End: 2025-08-14
Attending: NURSE PRACTITIONER
Payer: COMMERCIAL

## 2025-08-14 DIAGNOSIS — M25.512 LEFT SHOULDER PAIN, UNSPECIFIED CHRONICITY: Primary | ICD-10-CM

## 2025-08-14 PROCEDURE — 97110 THERAPEUTIC EXERCISES: CPT | Mod: GP | Performed by: PHYSICAL THERAPIST

## 2025-08-14 PROCEDURE — 20560 NDL INSJ W/O NJX 1 OR 2 MUSC: CPT | Performed by: PHYSICAL THERAPIST

## (undated) DEVICE — GLOVE PROTEXIS W/NEU-THERA 8.0  2D73TE80

## (undated) DEVICE — ESU ENDO SCISSORS 5MM CVD 5DCS

## (undated) DEVICE — NDL INSUFFLATION 13GA 120MM C2201

## (undated) DEVICE — SOL WATER IRRIG 1000ML BOTTLE 07139-09

## (undated) DEVICE — SYSTEM LAPAROVUE VISIBILITY LAPVUE10

## (undated) DEVICE — ENDO POUCH UNIV RETRIEVAL SYSTEM INZII 10MM CD001

## (undated) DEVICE — FILTER LAPROSHIELD SMOKE EVAC LSF1

## (undated) DEVICE — SOL NACL 0.9% IRRIG 1000ML BOTTLE 07138-09

## (undated) DEVICE — ADH SKIN CLOSURE PREMIERPRO EXOFIN 1.0ML 3470

## (undated) DEVICE — NDL BUTTERFLY 25GA .75" 367298

## (undated) DEVICE — SYR 05ML LL W/O NDL

## (undated) DEVICE — CATH INTERMITTENT CLEAN-CATH FEMALE 14FR 6" VINYL LF 420614

## (undated) DEVICE — SU VICRYL 0 UR-6 27" J603H

## (undated) DEVICE — PAD PERI INDIV WRAP 11" 2022

## (undated) DEVICE — LUBRICATING JELLY 4.25OZ

## (undated) DEVICE — ESU HOLSTER PLASTIC DISP E2400

## (undated) DEVICE — ESU LIGASURE MARYLAND JAW OPEN SEALER/DVDR 5MMX37CM LF1737

## (undated) DEVICE — ENDO TROCAR FIRST ENTRY KII FIOS ADV FIX 11X100MM CFF33

## (undated) DEVICE — ESU CORD MONOPOLAR 10'  E0510

## (undated) DEVICE — PREP SKIN SCRUB TRAY 4461A

## (undated) DEVICE — GLOVE PROTEXIS W/NEU-THERA 7.5  2D73TE75

## (undated) DEVICE — SUCTION IRR TRUMPET VALVE LAP ASU1201

## (undated) DEVICE — DEVICE SUTURE PASSER 14GA WECK EFX EFXSP2

## (undated) DEVICE — DECANTER VIAL 2006S

## (undated) DEVICE — PACK LAPAROSCOPY/PELVISCOPY STD

## (undated) DEVICE — ENDO TROCAR SLEEVE KII ADV FIXATION 05X100MM CFS02

## (undated) DEVICE — PREP CHLORHEXIDINE 4% 4OZ (HIBICLENS) 57504

## (undated) DEVICE — SUCTION MANIFOLD NEPTUNE 2 SYS 1 PORT 702-025-000

## (undated) DEVICE — SU VICRYL 4-0 FS-2 27" J422-H

## (undated) DEVICE — SYR 20ML LL W/O NDL

## (undated) DEVICE — ENDO TROCAR OPTICAL ACCESS KII Z-THRD 08X100MM C0Q19

## (undated) DEVICE — DRAPE POUCH INSTRUMENT 3 POCKET 1018L

## (undated) DEVICE — ENDO TROCAR FIRST ENTRY KII FIOS ADV FIX 05X100MM CFF03

## (undated) DEVICE — GOWN IMPERVIOUS SPECIALTY XLG/XLONG 32474

## (undated) DEVICE — TUBING C02 INSUFFLATION LAP FILTER HEATER 6198

## (undated) DEVICE — ANTIFOG SOLUTION W/FOAM PAD 31142527

## (undated) RX ORDER — PROPOFOL 10 MG/ML
INJECTION, EMULSION INTRAVENOUS
Status: DISPENSED
Start: 2022-06-06

## (undated) RX ORDER — FENTANYL CITRATE 50 UG/ML
INJECTION, SOLUTION INTRAMUSCULAR; INTRAVENOUS
Status: DISPENSED
Start: 2022-06-06

## (undated) RX ORDER — MAGNESIUM SULFATE HEPTAHYDRATE 40 MG/ML
INJECTION, SOLUTION INTRAVENOUS
Status: DISPENSED
Start: 2022-06-06

## (undated) RX ORDER — BUPIVACAINE HYDROCHLORIDE AND EPINEPHRINE 5; 5 MG/ML; UG/ML
INJECTION, SOLUTION EPIDURAL; INTRACAUDAL; PERINEURAL
Status: DISPENSED
Start: 2022-06-06

## (undated) RX ORDER — DEXAMETHASONE SODIUM PHOSPHATE 4 MG/ML
INJECTION, SOLUTION INTRA-ARTICULAR; INTRALESIONAL; INTRAMUSCULAR; INTRAVENOUS; SOFT TISSUE
Status: DISPENSED
Start: 2022-06-06

## (undated) RX ORDER — LIDOCAINE HYDROCHLORIDE 10 MG/ML
INJECTION, SOLUTION EPIDURAL; INFILTRATION; INTRACAUDAL; PERINEURAL
Status: DISPENSED
Start: 2022-06-06

## (undated) RX ORDER — GABAPENTIN 300 MG/1
CAPSULE ORAL
Status: DISPENSED
Start: 2022-06-06

## (undated) RX ORDER — PHENAZOPYRIDINE HYDROCHLORIDE 200 MG/1
TABLET, FILM COATED ORAL
Status: DISPENSED
Start: 2022-06-06

## (undated) RX ORDER — BUPIVACAINE HYDROCHLORIDE 2.5 MG/ML
INJECTION, SOLUTION INFILTRATION; PERINEURAL
Status: DISPENSED
Start: 2022-06-06

## (undated) RX ORDER — ACETAMINOPHEN 325 MG/1
TABLET ORAL
Status: DISPENSED
Start: 2022-06-06

## (undated) RX ORDER — KETOROLAC TROMETHAMINE 30 MG/ML
INJECTION, SOLUTION INTRAMUSCULAR; INTRAVENOUS
Status: DISPENSED
Start: 2022-06-06

## (undated) RX ORDER — SCOLOPAMINE TRANSDERMAL SYSTEM 1 MG/1
PATCH, EXTENDED RELEASE TRANSDERMAL
Status: DISPENSED
Start: 2022-06-06

## (undated) RX ORDER — MEPERIDINE HYDROCHLORIDE 25 MG/ML
INJECTION INTRAMUSCULAR; INTRAVENOUS; SUBCUTANEOUS
Status: DISPENSED
Start: 2022-06-06

## (undated) RX ORDER — ONDANSETRON 2 MG/ML
INJECTION INTRAMUSCULAR; INTRAVENOUS
Status: DISPENSED
Start: 2022-06-06

## (undated) RX ORDER — OXYCODONE HYDROCHLORIDE 5 MG/1
TABLET ORAL
Status: DISPENSED
Start: 2022-06-06